# Patient Record
Sex: MALE | Race: WHITE | NOT HISPANIC OR LATINO | Employment: UNEMPLOYED | URBAN - METROPOLITAN AREA
[De-identification: names, ages, dates, MRNs, and addresses within clinical notes are randomized per-mention and may not be internally consistent; named-entity substitution may affect disease eponyms.]

---

## 2018-01-16 ENCOUNTER — OFFICE VISIT (OUTPATIENT)
Dept: FAMILY MEDICINE | Facility: CLINIC | Age: 43
End: 2018-01-16
Payer: COMMERCIAL

## 2018-01-16 VITALS
WEIGHT: 172.5 LBS | SYSTOLIC BLOOD PRESSURE: 115 MMHG | HEIGHT: 72 IN | HEART RATE: 51 BPM | DIASTOLIC BLOOD PRESSURE: 71 MMHG | OXYGEN SATURATION: 98 % | TEMPERATURE: 97.6 F | BODY MASS INDEX: 23.36 KG/M2

## 2018-01-16 DIAGNOSIS — R09.81 NASAL CONGESTION: Primary | ICD-10-CM

## 2018-01-16 DIAGNOSIS — R53.83 OTHER FATIGUE: ICD-10-CM

## 2018-01-16 DIAGNOSIS — Z23 NEED FOR DIPHTHERIA-TETANUS-PERTUSSIS (TDAP) VACCINE: ICD-10-CM

## 2018-01-16 DIAGNOSIS — Z13.9 SCREENING FOR CONDITION: ICD-10-CM

## 2018-01-16 DIAGNOSIS — M72.2 PLANTAR FASCIITIS: ICD-10-CM

## 2018-01-16 DIAGNOSIS — L91.8 SKIN TAG: ICD-10-CM

## 2018-01-16 LAB
ALBUMIN SERPL-MCNC: 4.3 G/DL (ref 3.3–4.6)
ALP SERPL-CCNC: 55 U/L (ref 40–150)
ALT SERPL-CCNC: 21 U/L (ref 0–70)
AST SERPL-CCNC: 25 U/L (ref 0–55)
BASOPHILS # BLD AUTO: 0 10E9/L (ref 0–0.2)
BASOPHILS NFR BLD AUTO: 0.3 %
BILIRUB SERPL-MCNC: 1.7 MG/DL (ref 0.2–1.3)
BUN SERPL-MCNC: 9 MG/DL (ref 5–24)
CALCIUM SERPL-MCNC: 8.7 MG/DL (ref 8.5–10.4)
CHLORIDE SERPLBLD-SCNC: 105 MMOL/L (ref 94–109)
CHOLEST SERPL-MCNC: 164 MG/DL (ref 0–200)
CHOLEST/HDLC SERPL: 3.1 {RATIO} (ref 0–5)
CO2 SERPL-SCNC: 29 MMOL/L (ref 20–32)
CREAT SERPL-MCNC: 1.3 MG/DL (ref 0.8–1.5)
DIFFERENTIAL METHOD BLD: NORMAL
EGFR CALCULATED (BLACK REFERENCE): 77.5
EGFR CALCULATED (NON BLACK REFERENCE): 64
EOSINOPHIL # BLD AUTO: 0.1 10E9/L (ref 0–0.7)
EOSINOPHIL NFR BLD AUTO: 1.8 %
ERYTHROCYTE [DISTWIDTH] IN BLOOD BY AUTOMATED COUNT: 12.6 % (ref 10–15)
FASTING SPECIMEN: YES
GLUCOSE SERPL-MCNC: 101 MG/DL (ref 60–109)
HCT VFR BLD AUTO: 45.4 % (ref 40–53)
HDLC SERPL-MCNC: 54 MG/DL
HGB BLD-MCNC: 15.8 G/DL (ref 13.3–17.7)
IMM GRANULOCYTES # BLD: 0 10E9/L (ref 0–0.4)
IMM GRANULOCYTES NFR BLD: 0.1 %
LDLC SERPL CALC-MCNC: 93 MG/DL (ref 0–129)
LYMPHOCYTES # BLD AUTO: 1.7 10E9/L (ref 0.8–5.3)
LYMPHOCYTES NFR BLD AUTO: 25.8 %
MCH RBC QN AUTO: 32 PG (ref 26.5–33)
MCHC RBC AUTO-ENTMCNC: 34.8 G/DL (ref 31.5–36.5)
MCV RBC AUTO: 92 FL (ref 78–100)
MONOCYTES # BLD AUTO: 0.6 10E9/L (ref 0–1.3)
MONOCYTES NFR BLD AUTO: 8.8 %
NEUTROPHILS # BLD AUTO: 4.2 10E9/L (ref 1.6–8.3)
NEUTROPHILS NFR BLD AUTO: 63.2 %
NRBC # BLD AUTO: 0 10*3/UL
NRBC BLD AUTO-RTO: 0 /100
PLATELET # BLD AUTO: 189 10E9/L (ref 150–450)
POTASSIUM SERPL-SCNC: 4 MMOL/L (ref 3.4–5.3)
PROT SERPL-MCNC: 7.2 G/DL (ref 6.8–8.8)
RBC # BLD AUTO: 4.94 10E12/L (ref 4.4–5.9)
SODIUM SERPL-SCNC: 142 MMOL/L (ref 133–144)
TRIGL SERPL-MCNC: 85 MG/DL (ref 0–150)
TSH SERPL DL<=0.005 MIU/L-ACNC: 2.77 MU/L (ref 0.4–4)
VLDL-CHOLESTEROL: 17 (ref 7–32)
WBC # BLD AUTO: 6.7 10E9/L (ref 4–11)

## 2018-01-16 NOTE — NURSING NOTE
Screening Questionnaire for Adult Immunization    Are you sick today?   No   Do you have allergies to medications, food, a vaccine component or latex?   No   Have you ever had a serious reaction after receiving a vaccination?   No   Do you have a long-term health problem with heart disease, lung disease, asthma, kidney disease, metabolic disease (e.g. diabetes), anemia, or other blood disorder?   No   Do you have cancer, leukemia, HIV/AIDS, or any other immune system problem?   No   In the past 3 months, have you taken medications that affect  your immune system, such as prednisone, other steroids, or anticancer drugs; drugs for the treatment of rheumatoid arthritis, Crohn s disease, or psoriasis; or have you had radiation treatments?   No   Have you had a seizure, or a brain or other nervous system problem?   No   During the past year, have you received a transfusion of blood or blood     products, or been given immune (gamma) globulin or antiviral drug?   No   For women: Are you pregnant or is there a chance you could become        pregnant during the next month?   No   Have you received any vaccinations in the past 4 weeks?   No     Immunization questionnaire answers were all negative.      Given by Brenda Stovall. Patient instructed to remain in clinic for 15 minutes afterwards, and to report any adverse reaction to me immediately.       Screening performed by Brenda Stovall on 1/16/2018 at 9:21 AM.

## 2018-01-16 NOTE — NURSING NOTE
"42 year old  Chief Complaint   Patient presents with     Respiratory Problems     Works with chemicals and having respiratory issues.      Nose Problem     pt states he gets the booger like things in his nose. they are rock hard and appears to have blood in them. going on for 6 months now       Blood pressure 115/71, pulse 51, temperature 97.6  F (36.4  C), temperature source Oral, height 6' 0.24\" (183.5 cm), weight 172 lb 8 oz (78.2 kg), SpO2 98 %. Body mass index is 23.24 kg/(m^2).  There is no problem list on file for this patient.      Wt Readings from Last 2 Encounters:   01/16/18 172 lb 8 oz (78.2 kg)     BP Readings from Last 3 Encounters:   01/16/18 115/71         No current outpatient prescriptions on file.     No current facility-administered medications for this visit.        Social History   Substance Use Topics     Smoking status: Never Smoker     Smokeless tobacco: Never Used     Alcohol use Yes      Comment: maybe three drinks a week       Health Maintenance Due   Topic Date Due     TETANUS IMMUNIZATION (SYSTEM ASSIGNED)  09/07/1992     LIPID SCREEN Q5 YR MALE (SYSTEM ASSIGNED)  09/07/2009       No results found for: PAP      January 16, 2018 8:03 AM  "

## 2018-01-16 NOTE — PATIENT INSTRUCTIONS
Aquaphor ointment  Or   Vaseline petroleum jelly    1% hydrocortisone ointment    Apply several times a day with cotton swab    Nasal saline spray as needed      Plantar Fasciitis  Plantar fasciitis is a painful swelling of the plantar fascia. The plantar fascia is a thick, fibrous layer of tissue. It covers the bones on the bottom of your foot. And it supports the foot bones in an arched position.  This can happen gradually or suddenly. It usually affects one foot at a time. Heel pain can be sharp, like a knife sticking into the bottom of your foot. You may feel pain after exercising, long-distance jogging, stair climbing, long periods of standing, or after standing up.  Risk factors include: non-active lifestyle, arthritis, diabetes, obesity or recent weight gain, flat foot, high arch. Wearing high heels, loose shoes, or shoes with poor arch support for long periods of time adds to the risk. This problem is commonly found in runners and dancers. It also found in people who stand on hard surfaces for long periods of time.  Foot pain from this condition is usually worse in the morning. But it improves with walking. By the end of the day there may be a dull aching. Treatment requires short-term rest and controlling swelling. It may take up to 9 months before all symptoms go away. Rarely, a steroid injection into the foot, or surgery, may be needed.  Home care    If you are overweight, lose weight to help healing.    Choose supportive shoes with good arch support and shock absorbency. Replace athletic shoes when they become worn out. Don t walk or run barefoot.    Premade or custom-fitted shoe inserts may be helpful. Inserts made of silicone seem to be the most effective. Custom-made inserts can be provided by a podiatrist or foot specialist, physical therapist, or orthopedist.    Premade or custom-made night splints keep the heel stretched out while you sleep. They may prevent morning pain.    Avoid activities that  stress the feet: jogging, prolonged standing or walking, contact sports, etc.    First thing in the morning and before sports, stretch the bottom of your feet. Gently flex your ankle so the toes move toward your knee.    Icing may help control heel pain. Apply an ice pack to the heel for 10-20 minutes as a preventive. Or ice your heel after a severe flare-up of symptoms. You may repeat this every 1-2 hours as needed.    You may use over-the-counter pain medicine to control pain, unless another medicine was prescribed. Anti-inflammatory pain medicines, such as ibuprofen or naproxen, may work better than acetaminophen. If you have chronic liver or kidney disease or ever had a stomach ulcer or GI bleeding, talk with your healthcare provider before using these medicines.  Follow-up care  Follow up with your healthcare provider, physical therapist, or podiatrist or foot specialist as advised.  Call for an appointment if pain worsens or there is no relief after a few weeks of home treatment. Shoe inserts, a night splint, or a special boot may be required.  If X-rays were taken, you will be told of any new findings that may affect your care.  When to seek medical advice  Call your healthcare provider right away if any of these occur:    Foot swelling    Redness with increasing pain  Date Last Reviewed: 11/21/2015 2000-2017 [employer]. [address] [place] [address], [place] [address]. All rights reserved. This information is not intended as a substitute for professional medical care. Always follow your healthcare professional's instructions.

## 2018-01-16 NOTE — LETTER
[employer] Customer Service  [facility]  [address], [address]  [place] [address]  Fax: [phone]  Phone: [phone]      January 15, 2018      Tip Colin  [address]  [place] [address]        Dear Tip,    Thank you for your interest in becoming a [employer] user!    Your access code is: [email or web address]  Expires: 4/15/2018  7:53 AM     Please access the [employer] website at [email or web address].  Below the ID and password fields, select the  Sign Up Now  as New User.  You will be prompted to enter the access code listed above as well as additional personal information.  Please follow the directions carefully when creating your username and password.    If you allow your access code to , or if you have any questions please call a [employer] Representative at [phone] during normal clinic hours.     Sincerely,      [employer] Customer Service  [facility]

## 2018-01-16 NOTE — LETTER
[facility]  [address], [address]  [place] [address]  Phone: [phone]  Fax: [phone]       Date: January 17, 2018      Tip Colin  [address]  [place] [address]        Dear Tip,    Your thyroid level is in perfect range.    Your blood count profile looks perfect, there is no anemia or other abnormal findings.    Your cholesterol profile is normal.    Your glucose, kidney and liver tests are normal. Some lab results are noted to be slightly out of target range but this is not concerning. No follow up testing is indicated.    It was a pleasure to meet you . Thank you for choosing [facility] for your care.    Sincerely,    Fatuma Houston MD  Internal Medicine/Pediatrics    Resulted Orders   CBC with platelets differential   Result Value Ref Range    WBC 6.7 4.0 - 11.0 10e9/L    RBC Count 4.94 4.4 - 5.9 10e12/L    Hemoglobin 15.8 13.3 - 17.7 g/dL    Hematocrit 45.4 40.0 - 53.0 %    MCV 92 78 - 100 fl    MCH 32.0 26.5 - 33.0 pg    MCHC 34.8 31.5 - 36.5 g/dL    RDW 12.6 10.0 - 15.0 %    Platelet Count 189 150 - 450 10e9/L    Diff Method Automated Method     % Neutrophils 63.2 %    % Lymphocytes 25.8 %    % Monocytes 8.8 %    % Eosinophils 1.8 %    % Basophils 0.3 %    % Immature Granulocytes 0.1 %    Nucleated RBCs 0 0 /100    Absolute Neutrophil 4.2 1.6 - 8.3 10e9/L    Absolute Lymphocytes 1.7 0.8 - 5.3 10e9/L    Absolute Monocytes 0.6 0.0 - 1.3 10e9/L    Absolute Eosinophils 0.1 0.0 - 0.7 10e9/L    Absolute Basophils 0.0 0.0 - 0.2 10e9/L    Abs Immature Granulocytes 0.0 0 - 0.4 10e9/L    Absolute Nucleated RBC 0.0    Lipid Panel ([place])   Result Value Ref Range    FASTING SPECIMEN yes     Cholesterol 164.0 0.0 - 200.0    HDL Cholesterol 54.0 >40.0    Triglycerides 85.0 0.0 - 150.0    Cholesterol/HDL Ratio 3.1 0.0 - 5.0    LDL Cholesterol Direct 93.0 0.0 - 129.0    VLDL-Cholesterol 17.0 7.0 - 32.0    Comprehensive Metabolic Panel ([place])   Result Value Ref Range    Glucose 101.0 60.0 - 109.0 mg/dL    Urea Nitrogen 9.0 5.0 - 24.0 mg/dL    Calcium 8.7 8.5 - 10.4 mg/dL    Creatinine 1.3 0.8 - 1.5 mg/dL    eGFR Calculated (Non Black Reference) 64.0 >60.0    eGFR Calculated (Black Reference) 77.5 >60.0    Sodium 142.0 133.0 - 144.0 mmol/L    Potassium 4.0 3.4 - 5.3 mmol/L    Chloride 105.0 94.0 - 109.0 mmol/L    Carbon Dioxide 29.0 20.0 - 32.0 mmol/L    Albumin 4.3 3.3 - 4.6 g/dL    Alkaline Phosphatase 55.0 40.0 - 150.0 U/L    ALT 21.0 0.0 - 70.0 U/L    AST 25.0 0.0 - 55.0 U/L    Bilirubin Total 1.7 (H) 0.2 - 1.3 mg/dL    Protein Total 7.2 6.8 - 8.8 g/dL   TSH with free T4 reflex   Result Value Ref Range    TSH 2.77 0.40 - 4.00 mU/L

## 2018-01-27 PROBLEM — M72.2 PLANTAR FASCIITIS: Status: ACTIVE | Noted: 2018-01-27

## 2018-01-27 PROBLEM — R09.81 NASAL CONGESTION: Status: ACTIVE | Noted: 2018-01-27
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
## 2018-08-23 ENCOUNTER — OFFICE VISIT (OUTPATIENT)
Dept: OPHTHALMOLOGY | Facility: CLINIC | Age: 43
End: 2018-08-23
Payer: COMMERCIAL

## 2018-08-23 DIAGNOSIS — H52.13 MYOPIA OF BOTH EYES: Primary | ICD-10-CM

## 2018-08-23 ASSESSMENT — CUP TO DISC RATIO
OS_RATIO: 0.20
OD_RATIO: 0.20

## 2018-08-23 ASSESSMENT — REFRACTION_CURRENTRX
OD_CYLINDER: SPHERE
OD_DIAMETER: 14.0
OS_CYLINDER: SPHERE
OD_DIAMETER: 14.0
OD_SPHERE: -3.50
OS_BRAND: AV OASYS
OD_SPHERE: -3.75
OD_BASECURVE: 8.4
OS_BRAND: AV OASYS
OS_BASECURVE: 8.4
OS_BASECURVE: 8.4
OS_SPHERE: -3.00
OD_BASECURVE: 8.4
OS_DIAMETER: 14.0
OS_SPHERE: -3.25
OD_BRAND: AV OASYS
OS_CYLINDER: SPHERE
OD_CYLINDER: SPHERE
OS_DIAMETER: 14.0
OD_BRAND: AV OASYS

## 2018-08-23 ASSESSMENT — REFRACTION_MANIFEST
OS_CYLINDER: SPHERE
OD_CYLINDER: SPHERE
OS_SPHERE: -3.25
OD_SPHERE: -3.50

## 2018-08-23 ASSESSMENT — CONF VISUAL FIELD
OS_NORMAL: 1
OD_NORMAL: 1

## 2018-08-23 ASSESSMENT — VISUAL ACUITY
METHOD: SNELLEN - LINEAR
OD_PH_SC: .
CORRECTION_TYPE: CONTACTS
OS_CC: 20/25
OS_CC+: -1
OD_CC: 20/20

## 2018-08-23 ASSESSMENT — TONOMETRY
OD_IOP_MMHG: 14
IOP_METHOD: ICARE
OS_IOP_MMHG: 14

## 2018-08-23 ASSESSMENT — SLIT LAMP EXAM - LIDS
COMMENTS: NORMAL
COMMENTS: NORMAL

## 2018-08-23 NOTE — MR AVS SNAPSHOT
After Visit Summary   2018    Tip Colin    MRN: [id number]           Patient Information     Date Of Birth          1975        Visit Information        Provider Department      2018 8:40 AM Edith Solo OD [facility]        Today's Diagnoses     Myopia of both eyes    -  1       Follow-ups after your visit        Who to contact     Please call your clinic at [phone] to:    Ask questions about your health    Make or cancel appointments    Discuss your medicines    Learn about your test results    Speak to your doctor            Additional Information About Your Visit        MyChart Information     tadoÂ° is an electronic gateway that provides easy, online access to your medical records. With tadoÂ°, you can request a clinic appointment, read your test results, renew a prescription or communicate with your care team.     To sign up for tadoÂ° visit the website at [email or web address]   You will be asked to enter the access code listed below, as well as some personal information. Please follow the directions to create your username and password.     Your access code is: [email or web address]  Expires: 2018  6:30 AM     Your access code will  in 90 days. If you need help or a new code, please contact your [facility] or call [phone] for assistance.        Care EveryWhere ID     This is your Care EveryWhere ID. This could be used by other organizations to access your [facility] medical records  [id number]         Blood Pressure from Last 3 Encounters:   18 115/71    Weight from Last 3 Encounters:   18 78.2 kg (172 lb 8 oz)              We Performed the Following     REFRACTION [[id number]]        Primary Care Provider Fax #    Physician No Ref-Primary [phone]       No address on file        Equal Access to Services     HUONG HOUSTON : gayla Zhao qaybta kaalmada  libby carlsonolirafita la'aasamira ahBenji [facility] [phone].    ATENCIÓN: Si habla jose angelañol, tiene a almeida disposición servicios gratuitos de asistencia lingüística. Anne al [phone].    We comply with applicable federal civil rights laws and [place] laws. We do not discriminate on the basis of race, color, national origin, age, disability, sex, sexual orientation, or gender identity.            Thank you!     Thank you for choosing [facility]  for your care. Our goal is always to provide you with excellent care. Hearing back from our patients is one way we can continue to improve our services. Please take a few minutes to complete the written survey that you may receive in the mail after your visit with us. Thank you!             Your Updated Medication List - Protect others around you: Learn how to safely use, store and throw away your medicines at www.disposemymeds.org.      Notice  As of 8/23/2018 10:52 AM    You have not been prescribed any medications.

## 2018-08-23 NOTE — NURSING NOTE
"Chief Complaints and History of Present Illnesses   Patient presents with     COMPREHENSIVE EYE EXAM     HPI    Affected eye(s):  Both   Symptoms:     No blurred vision   No floaters   No flashes   No tearing   Dryness (Comment: \"a little bit\" per pt, wearing the CTL he has in for over a year. )         Do you have eye pain now?:  No      Comments:  Patient notes that CTL are comfortable, unsure if vision has changed, currently wearing the wrong CTL RX in one eye.     Tracy Torres August 23, 2018 8:41 AM               "

## 2018-08-23 NOTE — PROGRESS NOTES
A/P  1.) Myopia each eye  -Spec Rx updated, asymptomatic at this time for presbyopia  -Mild CL overwear - stressed CL hygiene with pt  -Dilated ocular health unremarkable each eye  -Discussed upcoming presbyopia and options    Monitor 1-2 years routine, sooner prn    I have confirmed the patient's CC, HPI and reviewed Past Medical History, Past Surgical History, Social History, Family History, Problem List, Medication List and agree with Tech note.     Edith Solo, OD FAAO

## 2019-06-14 NOTE — PROGRESS NOTES
"Tip Colin is here for a general check up.  He is not fasting. He is up to date on eye exams (glasses) but overdue on dental visits. Wears seat belt.--yes Wears bike helmet--na.  Concerns today:    HCM  Tip is here for preventive exam. Up to date on vaccines.     Diet   He reports his diet is \"terrible\". Eats Chips pretzels soda instead of nutritious lunch. Eats prepackaged meals for dinner. Little in the way of fruits, veggies. Body mass index is 23.8 kg/m .  No formal exercise program.    Testicular discomfort  Tip reports a 3 month history of right-sided testicular discomfort. No injuries. Testicle is sensitive even with light touch. No dysuria, no pelvic pain or fever. He reports that he had a testicular mass in the past, seen on ultrasound,a number of years ago.. He thinks this was a spermatocele. Cannot recall where the testing had been done. He is concerned that there may be a mass. Not taking anything at this time for pain. No family history of any testicular cancers.    Chronic pain of left knee  Tip has had surgery at the left lateral knee. This was evidently done for a bone spur that was rubbing against the tendon. He now reports that he has episodic jabbing pain at the left lateral knee and a sense that his knees going to give out. This usually happens when he goes from a sitting to a standing position. Sometimes happens when he lifts his 3-year-old son. There is no redness warmth or swelling.    Benign paroxysmal positional vertigo, unspecified laterality  He has a history of vertigo. The last significant episode was about 3 years ago when he vomited. He tells me he is prone to have vertigo, no symptoms today. He is wondering if there is anything that he can take. He had exercises that he was doing but thinks he has lost them. Not sure he has time to go to a formal vestibular therapy clinic. We discussed Cawthorne exercises and looking on line for vestibular therapy pearls. If that's not " helping, then referral is certainly indicated. He may use meclizine over-the-counter as well.      Health Maintenance   Topic Date Due     PREVENTIVE CARE VISIT  1975     HIV SCREENING  09/07/1989     PHQ-2  01/01/2019     INFLUENZA VACCINE (Season Ended) 09/01/2019     LIPID  01/16/2023     ZOSTER IMMUNIZATION (1 of 2) 09/07/2024     DTAP/TDAP/TD IMMUNIZATION (2 - Td) 01/16/2028     IPV IMMUNIZATION  Aged Out     MENINGITIS IMMUNIZATION  Aged Out         Patient Active Problem List   Diagnosis     Nasal congestion     Plantar fasciitis       Past Surgical History:   Procedure Laterality Date     DENTAL SURGERY       KNEE SURGERY Left     bone spur removed       Family History   Problem Relation Age of Onset     Uterine Cancer Mother      Retinal detachment Father      Breast Cancer Sister 53     Cerebrovascular Disease Paternal Grandfather      Retinal detachment Brother      Glaucoma No family hx of      Macular Degeneration No family hx of        Social    Works as a   Son 3 yo    HABITS:  Tob: never  ETOH: 3/week  Calcium: 0-1 per day  Caffeine: 20 oz cola  Exercise: walking    MALE ROS  Partner: monogamous with spouse  ED: none  Contraception: condoms  STD concerns: none  BPH: no symptoms      No current outpatient medications on file.     No Known Allergies      ROS  CONSTITUTIONAL:NEGATIVE for fever, chills, change in weight  INTEGUMENTARY/SKIN: NEGATIVE for worrisome rashes, moles or lesions  EYES: NEGATIVE for vision changes or irritation  ENT/MOUTH: NEGATIVE for ear, mouth and throat problems  RESP:NEGATIVE for significant cough or SOB  CV: NEGATIVE for chest pain, palpitations, RIVAS, orthopnea, PND  or peripheral edema  GI: NEGATIVE for nausea, abdominal pain, heartburn, or change in bowel habits  :NEGATIVE for frequency, dysuria, or hematuria,right-sided testicular pain, as above  MUSCULOSKELETAL:knee pain as above  NEURO: NEGATIVE for weakness, dizziness or  paresthesias,episodic vertigo as above  ENDOCRINE: NEGATIVE for polyuria/dipsia,  temperature intolerance, skin/hair changes  HEME/ALLERGY/IMMUNE: NEGATIVE for bleeding problems  PSYCHIATRIC: history of depression, anxiety, has seen counselor in the past, mood is stable, no current meds    PHQ9 score = 8  ANAIS 7 score = 2    EXAM  /72 (BP Location: Right arm, Patient Position: Sitting, Cuff Size: Adult Regular)   Pulse 62   Temp 97.4  F (36.3  C) (Oral)   Resp 16   Ht 1.829 m (6')   Wt 79.6 kg (175 lb 8 oz)   SpO2 97%   BMI 23.80 kg/m    GENERAL APPEARANCE: Alert, pleasant, NAD  EYES: PERRL, EOMI, conjunctiva clear  HENT: TM normal bilaterally. Nose and mouth without lesions  NECK: no adenopathy, thyroid normal to palpation  RESP: lungs clear to auscultation bilaterally,   Axillae: no palpable axillary masses or adenopathy  CV: regular rate and rhythm, normal S1 S2, no murmur, no carotid bruits  ABDOMEN: soft, nontender, without HSM or masses. Bowel sounds normal  : no inguinal hernias or adenopathy, no testicular masses, no significant tenderness but there is some thickening at the epididymis on the right side  Rectal exam: deferred  MS: extremities normal- no gross deformities noted, no tender, hot or swollen joints.well healed incision at lateral joint line of left knee.  I cannot reproduce any pain with palpation over the left knee  SKIN: no suspicious lesions or rashes  NEURO: Normal strength and tone, sensory exam grossly normal, DTR normoreflexive in upper and lower extremities  PSYCH: mentation appears normal. and affect normal/bright.  EXT: no peripheral edema, pedal pulses palpable    Assessment:  (Z00.00) Preventative health care  (primary encounter diagnosis)  Comment: 43-year-old male in stable health  Plan: HIV Screening, Lipid Profile, CBC with Plt         ([facility])        Anticipatory guidance given today regarding diet, exercise and calcium intake, safety. Immunizations are up-to-date.  Recommended healthier diet, trying to incorporate fresh fruits and vegetables slowly, cooking more meals at home or packing lunch rather than eating chips and soda for lunch      (N50.819) Testicular discomfort  Comment: right-sided discomfort, previous history of a benign testicular mass, this is not palpated today  Plan: US Testicular and Scrotum, UROLOGY ADULT         REFERRAL        Refer for a testicular ultrasound and follow-up appointment with the urologist    (M25.562,  G89.29) Chronic pain of left knee  Comment: previous history of left knee tendon surgery. Now left knee is buckling when he goes from a sitting to standing position  Plan: ORTHOPEDICS ADULT REFERRAL        Recommend he see orthopedic surgeon for evaluation and treatment    (H81.10) Benign paroxysmal positional vertigo, unspecified laterality  Comment: no current symptoms today  Plan: recommend, Cawthorne exercises, contact me for referral to vestibular therapy if needed.    Fatuma Houston MD  Internal Medicine/Pediatrics

## 2019-06-22 ENCOUNTER — ANCILLARY PROCEDURE (OUTPATIENT)
Dept: ULTRASOUND IMAGING | Facility: CLINIC | Age: 44
End: 2019-06-22
Attending: INTERNAL MEDICINE
Payer: COMMERCIAL

## 2019-06-22 ENCOUNTER — OFFICE VISIT (OUTPATIENT)
Dept: FAMILY MEDICINE | Facility: CLINIC | Age: 44
End: 2019-06-22
Payer: COMMERCIAL

## 2019-06-22 VITALS
OXYGEN SATURATION: 97 % | TEMPERATURE: 97.4 F | HEART RATE: 62 BPM | WEIGHT: 175.5 LBS | DIASTOLIC BLOOD PRESSURE: 72 MMHG | HEIGHT: 72 IN | SYSTOLIC BLOOD PRESSURE: 129 MMHG | RESPIRATION RATE: 16 BRPM | BODY MASS INDEX: 23.77 KG/M2

## 2019-06-22 DIAGNOSIS — M25.562 CHRONIC PAIN OF LEFT KNEE: ICD-10-CM

## 2019-06-22 DIAGNOSIS — H81.10 BENIGN PAROXYSMAL POSITIONAL VERTIGO, UNSPECIFIED LATERALITY: ICD-10-CM

## 2019-06-22 DIAGNOSIS — N50.819 TESTICULAR DISCOMFORT: ICD-10-CM

## 2019-06-22 DIAGNOSIS — G89.29 CHRONIC PAIN OF LEFT KNEE: ICD-10-CM

## 2019-06-22 DIAGNOSIS — Z00.00 PREVENTATIVE HEALTH CARE: Primary | ICD-10-CM

## 2019-06-22 LAB
CHOLEST SERPL-MCNC: 154 MG/DL
ERYTHROCYTE [DISTWIDTH] IN BLOOD BY AUTOMATED COUNT: 12 %
HCT VFR BLD AUTO: 41 % (ref 40–53)
HDLC SERPL-MCNC: 52 MG/DL
HEMOGLOBIN: 14.7 G/DL (ref 13.3–17.7)
LDLC SERPL CALC-MCNC: 84 MG/DL
MCH RBC QN AUTO: 31.9 PG (ref 26.5–35)
MCHC RBC AUTO-ENTMCNC: 35.9 G/DL (ref 32–36)
MCV RBC AUTO: 88.9 FL (ref 78–100)
NONHDLC SERPL-MCNC: 102 MG/DL
PLATELET # BLD AUTO: 197 K/UL (ref 150–450)
RBC # BLD AUTO: 4.61 M/UL (ref 4.4–5.9)
TRIGL SERPL-MCNC: 93 MG/DL
WBC # BLD AUTO: 9 K/UL (ref 4–11)

## 2019-06-22 ASSESSMENT — MIFFLIN-ST. JEOR: SCORE: 1724.06

## 2019-06-22 NOTE — LETTER
[facility]  [address], [address]  [place] [address]  Phone: [phone]  Fax: [phone]       Date: June 25, 2019      Tip Colin  [address]  [place] [address]        Dear Tip,    Enclosed are you lab results.  Your blood counts and HIV tests are normal.    Your cholesterol panel looks fabulous!    Nothing worrisome on your testicular ultrasound. I'd have you follow up with the urologist in regard to the persistent testicular discomfort.    Sincerely,  Fatuma Houston MD  Internal Medicine/Pediatrics    Resulted Orders   HIV Screening   Result Value Ref Range    HIV Antigen Antibody Combo Nonreactive NR^Nonreactive          Comment:      HIV-1 p24 Ag & HIV-1/HIV-2 Ab Not Detected   Lipid Profile   Result Value Ref Range    Cholesterol 154 <200 mg/dL    Triglycerides 93 <150 mg/dL    HDL Cholesterol 52 >39 mg/dL    LDL Cholesterol Calculated 84 <100 mg/dL      Comment:      Desirable:       <100 mg/dl    Non HDL Cholesterol 102 <130 mg/dL   CBC with Plt ([facility])   Result Value Ref Range    WBC 9.0 4.0 - 11.0 K/uL    RBC 4.61 4.40 - 5.90 M/uL    Hemoglobin 14.7 13.3 - 17.7 g/dL    Hematocrit 41.0 40.0 - 53.0 %    MCV 88.9 78.0 - 100.0 fL    MCH 31.9 26.5 - 35.0 pg    MCHC 35.9 32.0 - 36.0 g/dL    Platelets 197.0 150.0 - 450.0 K/uL    RDW 12.0 %

## 2019-06-22 NOTE — NURSING NOTE
43 year old  Chief Complaint   Patient presents with     Physical       Blood pressure 129/72, pulse 62, temperature 97.4  F (36.3  C), temperature source Oral, resp. rate 16, height 1.829 m (6'), weight 79.6 kg (175 lb 8 oz), SpO2 97 %. Body mass index is 23.8 kg/m .  Patient Active Problem List   Diagnosis     Nasal congestion     Plantar fasciitis       Wt Readings from Last 3 Encounters:   06/22/19 79.6 kg (175 lb 8 oz)   01/16/18 78.2 kg (172 lb 8 oz)     BP Readings from Last 6 Encounters:   06/22/19 129/72   01/16/18 115/71       No current outpatient medications on file.     No current facility-administered medications for this visit.        Social History     Tobacco Use     Smoking status: Never Smoker     Smokeless tobacco: Never Used   Substance Use Topics     Alcohol use: Yes     Comment: maybe three drinks a week     Drug use: No       Health Maintenance Due   Topic Date Due     PREVENTIVE CARE VISIT  1975     HIV SCREENING  09/07/1989     PHQ-2  01/01/2019       No results found for: ROBERT Contreras CMA  June 22, 2019 10:29 AM

## 2019-06-24 ENCOUNTER — TELEPHONE (OUTPATIENT)
Dept: FAMILY MEDICINE | Facility: CLINIC | Age: 44
End: 2019-06-24

## 2019-06-24 LAB — HIV 1+2 AB+HIV1 P24 AG SERPL QL IA: NONREACTIVE

## 2019-06-24 ASSESSMENT — ANXIETY QUESTIONNAIRES
2. NOT BEING ABLE TO STOP OR CONTROL WORRYING: NOT AT ALL
7. FEELING AFRAID AS IF SOMETHING AWFUL MIGHT HAPPEN: NOT AT ALL
5. BEING SO RESTLESS THAT IT IS HARD TO SIT STILL: NOT AT ALL
3. WORRYING TOO MUCH ABOUT DIFFERENT THINGS: NOT AT ALL
GAD7 TOTAL SCORE: 2
IF YOU CHECKED OFF ANY PROBLEMS ON THIS QUESTIONNAIRE, HOW DIFFICULT HAVE THESE PROBLEMS MADE IT FOR YOU TO DO YOUR WORK, TAKE CARE OF THINGS AT HOME, OR GET ALONG WITH OTHER PEOPLE: NOT DIFFICULT AT ALL
6. BECOMING EASILY ANNOYED OR IRRITABLE: SEVERAL DAYS
1. FEELING NERVOUS, ANXIOUS, OR ON EDGE: SEVERAL DAYS

## 2019-06-24 ASSESSMENT — PATIENT HEALTH QUESTIONNAIRE - PHQ9
5. POOR APPETITE OR OVEREATING: NOT AT ALL
SUM OF ALL RESPONSES TO PHQ QUESTIONS 1-9: 8

## 2019-06-24 NOTE — TELEPHONE ENCOUNTER
Spoke to patient and relayed Dr. Houston's message. Patient verbalized understanding and agrees with this plan.   Chari Wakefield RN  06/24/19  10:07 AM

## 2019-06-24 NOTE — TELEPHONE ENCOUNTER
----- Message from Fatuma Houston MD sent at 6/24/2019  7:49 AM CDT -----  I saw this patient on Saturday. He is not on My Chart but I ordered labs and a testicular US.    Will you please call pt to let him know his US looks benign . Still see Urology for testicular pain.    Labs are normal.    I'll be in clinic tomorrow.    Thank you!  Fatuma

## 2019-06-25 ASSESSMENT — ANXIETY QUESTIONNAIRES: GAD7 TOTAL SCORE: 2

## 2019-08-16 ENCOUNTER — PRE VISIT (OUTPATIENT)
Dept: UROLOGY | Facility: CLINIC | Age: 44
End: 2019-08-16

## 2019-08-16 NOTE — TELEPHONE ENCOUNTER
MEDICAL RECORDS REQUEST   [facility]  [address] [place] [address]  PHONE: [phone]  Fax: [phone]        FUTURE VISIT INFORMATION                                                   Tip Colin, : 1975 scheduled for future visit at [facility]    APPOINTMENT INFORMATION:    Date: 19    Provider:  LAURA CROCKETT    Reason for Visit/Diagnosis: TESTICULAR DISCOMFORT    REFERRAL INFORMATION:    Referring provider:  NIKOS HERNANDEZ    Specialty: MD    Referring providers clinic:  [facility] contact number:  [phone];    RECORDS REQUESTED FOR VISIT                                                     NOTES  STATUS/DETAILS   OFFICE NOTE from referring provider  yes   OFFICE NOTE from other specialist  no   DISCHARGE SUMMARY from hospital  no   DISCHARGE REPORT from the ER  no   OPERATIVE REPORT  no   MEDICATION LIST  no       PRE-VISIT CHECKLIST      Record collection complete Yes   Appointment appropriately scheduled           (right time/right provider) Yes   MyChart activation No   Questionnaire complete If no, please explain IN PROCESS     Completed by: Niki Romero

## 2019-08-16 NOTE — TELEPHONE ENCOUNTER
Reason for Visit: Consult    Diagnosis: Testicular discomfort, testicular lumps per pt's wife    Orders/Procedures/Records: Records available    Contact Patient: N/A    Rooming Requirements: Rosemary Rand  08/16/19  9:03 AM

## 2019-08-20 NOTE — PROGRESS NOTES
Chief Complaint:   Right-sided testicular pain         History of Present Illness:    Tip Colin is a very pleasant 43 year old male who presents for evaluation of the above. Per chart review, he has been experiencing this pain for the past 3 months. He reported to his PCP that he has had a testicular mass in the past, evaluated with an ultrasound, and he believed that this was a spermatocele. A scrotal US was repeated on 6/22/19, which demonstrated a probable small right testicular appendix and a 4 mm left epididymal head cyst. Today, however, he states that this is no longer giving him pain.            Past Medical History:   History reviewed. No pertinent past medical history.         Past Surgical History:     Past Surgical History:   Procedure Laterality Date     DENTAL SURGERY       KNEE SURGERY Left     bone spur removed            Medications     No current outpatient medications on file.     No current facility-administered medications for this visit.             Family History:     Family History   Problem Relation Age of Onset     Uterine Cancer Mother      Breast Cancer Sister 53     Cerebrovascular Disease Paternal Grandfather      Retinal detachment Brother      Glaucoma No family hx of      Macular Degeneration No family hx of             Social History:     Social History     Socioeconomic History     Marital status:      Spouse name: Not on file     Number of children: Not on file     Years of education: Not on file     Highest education level: Not on file   Occupational History     Not on file   Social Needs     Financial resource strain: Not on file     Food insecurity:     Worry: Not on file     Inability: Not on file     Transportation needs:     Medical: Not on file     Non-medical: Not on file   Tobacco Use     Smoking status: Never Smoker     Smokeless tobacco: Never Used   Substance and Sexual Activity     Alcohol use: Yes     Comment: maybe three drinks a week     Drug use:  No     Sexual activity: Yes     Partners: Female     Birth control/protection: Condom   Lifestyle     Physical activity:     Days per week: Not on file     Minutes per session: Not on file     Stress: Not on file   Relationships     Social connections:     Talks on phone: Not on file     Gets together: Not on file     Attends Holiness service: Not on file     Active member of club or organization: Not on file     Attends meetings of clubs or organizations: Not on file     Relationship status: Not on file     Intimate partner violence:     Fear of current or ex partner: Not on file     Emotionally abused: Not on file     Physically abused: Not on file     Forced sexual activity: Not on file   Other Topics Concern     Parent/sibling w/ CABG, MI or angioplasty before 65F 55M? Not Asked   Social History Narrative     Not on file            Allergies:   Patient has no known allergies.         Review of Systems:  From intake questionnaire   Negative 14 system review except as noted on HPI, nurse's note.         Physical Exam:   Patient is a 43 year old  male   Vitals: Blood pressure 114/79, pulse 62, height 1.829 m (6'), weight 79.4 kg (175 lb).  General Appearance Adult: Alert, no acute distress, oriented  Lungs: no respiratory distress, or pursed lip breathing  Heart: No obvious jugular venous distension present  Abdomen: soft, nontender, no organomegaly or masses, Body mass index is 23.73 kg/m .  Musculoskeltal: extremities normal, no peripheral edema  Skin: no suspicious lesions or rashes  Neuro: Alert, oriented, speech and mentation normal  : deferred      Labs and Pathology:    I personally reviewed all applicable laboratory data and went over findings with patient  Significant for:    CBC RESULTS:  Recent Labs   Lab Test 06/22/19  1141 01/16/18  0924   WBC  --  6.7   HGB 14.7 15.8   PLT  --  189        BMP RESULTS:  Recent Labs   Lab Test 01/16/18  0916   .0   POTASSIUM 4.0   CHLORIDE 105.0   CO2 29.0    .0   BUN 9.0   CR 1.3   CHRISTEL 8.7         Imaging:    I personally reviewed all applicable imaging and went over findings with patient.  Significant for:    Results for orders placed or performed in visit on 06/22/19   US Testicular and Scrotum    Narrative    EXAMINATION: US TESTICULAR AND SCROTAL, 6/22/2019 1:38 PM     COMPARISON: None.    HISTORY: None available    TECHNIQUE: The scrotum was scanned in standard fashion with  specialized ultrasound transducer(s) using both grey scale and limited  color Doppler techniques.    Findings:  The testes demonstrate normal and symmetric echotexture and  vascularity. No evidence of a focal lesion except for a small possible  testicular appendix near the epididymal head.  The right testicle  measures 5.3 x 3.3 x 3.0 cm and the left measures 5.1 x 3.3 x 2.7 cm .  There is no evidence of torsion.    There is no evidence of a significant hydrocele or varicocele.    The left epididymis shows a 4 mm cyst, but is otherwise unremarkable.      Impression    Impression:   1. 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  2. 4 mm left epididymal head cyst.  3. Otherwise, normal sonographic appearance of the scrotum.    KARYN AUGUSTE            Assessment and Plan:     Assessment: 43 year old male with previous scrotal pain, found to have 4 mm left epididymal head cyst on US. Fortunately, this is no longer causing him pain. We discussed that this is benign, and would only warrant further intervention if it causes significant pain.     Plan:  -Follow up as needed. For worsening pain, follow up with Dr. Clarke to discuss potential interventions.       Diane Ibrahim, CNP  Department of Urology

## 2019-08-23 ENCOUNTER — OFFICE VISIT (OUTPATIENT)
Dept: UROLOGY | Facility: CLINIC | Age: 44
End: 2019-08-23
Attending: INTERNAL MEDICINE
Payer: COMMERCIAL

## 2019-08-23 ENCOUNTER — PRE VISIT (OUTPATIENT)
Dept: UROLOGY | Facility: CLINIC | Age: 44
End: 2019-08-23

## 2019-08-23 VITALS
HEART RATE: 62 BPM | BODY MASS INDEX: 23.7 KG/M2 | WEIGHT: 175 LBS | DIASTOLIC BLOOD PRESSURE: 79 MMHG | SYSTOLIC BLOOD PRESSURE: 114 MMHG | HEIGHT: 72 IN

## 2019-08-23 DIAGNOSIS — L72.9 SCROTAL CYST: Primary | ICD-10-CM

## 2019-08-23 ASSESSMENT — MIFFLIN-ST. JEOR: SCORE: 1721.79

## 2019-08-23 ASSESSMENT — PAIN SCALES - GENERAL: PAINLEVEL: NO PAIN (0)

## 2019-08-23 NOTE — LETTER
8/23/2019       RE: Tip Colin  [address] [place] [address]     Dear Colleague,    Thank you for referring your patient, Tip Colin, to the [facility] UROLOGY AND INST FOR PROSTATE AND UROLOGIC CANCERS at [facility]. Please see a copy of my visit note below.            Chief Complaint:   Right-sided testicular pain         History of Present Illness:    Tip Colin is a very pleasant 43 year old male who presents for evaluation of the above. Per chart review, he has been experiencing this pain for the past 3 months. He reported to his PCP that he has had a testicular mass in the past, evaluated with an ultrasound, and he believed that this was a spermatocele. A scrotal US was repeated on 6/22/19, which demonstrated a probable small right testicular appendix and a 4 mm left epididymal head cyst. Today, however, he states that this is no longer giving him pain.            Past Medical History:   History reviewed. No pertinent past medical history.         Past Surgical History:     Past Surgical History:   Procedure Laterality Date     DENTAL SURGERY       KNEE SURGERY Left     bone spur removed            Medications     No current outpatient medications on file.     No current facility-administered medications for this visit.             Family History:     Family History   Problem Relation Age of Onset     Uterine Cancer Mother      Breast Cancer Sister 53     Cerebrovascular Disease Paternal Grandfather      Retinal detachment Brother      Glaucoma No family hx of      Macular Degeneration No family hx of             Social History:     Social History     Socioeconomic History     Marital status:      Spouse name: Not on file     Number of children: Not on file     Years of education: Not on file     Highest education level: Not on file   Occupational History     Not on file   Social Needs     Financial resource strain: Not on file     Food insecurity:      Worry: Not on file     Inability: Not on file     Transportation needs:     Medical: Not on file     Non-medical: Not on file   Tobacco Use     Smoking status: Never Smoker     Smokeless tobacco: Never Used   Substance and Sexual Activity     Alcohol use: Yes     Comment: maybe three drinks a week     Drug use: No     Sexual activity: Yes     Partners: Female     Birth control/protection: Condom   Lifestyle     Physical activity:     Days per week: Not on file     Minutes per session: Not on file     Stress: Not on file   Relationships     Social connections:     Talks on phone: Not on file     Gets together: Not on file     Attends Advent service: Not on file     Active member of club or organization: Not on file     Attends meetings of clubs or organizations: Not on file     Relationship status: Not on file     Intimate partner violence:     Fear of current or ex partner: Not on file     Emotionally abused: Not on file     Physically abused: Not on file     Forced sexual activity: Not on file   Other Topics Concern     Parent/sibling w/ CABG, MI or angioplasty before 65F 55M? Not Asked   Social History Narrative     Not on file            Allergies:   Patient has no known allergies.         Review of Systems:  From intake questionnaire   Negative 14 system review except as noted on HPI, nurse's note.         Physical Exam:   Patient is a 43 year old  male   Vitals: Blood pressure 114/79, pulse 62, height 1.829 m (6'), weight 79.4 kg (175 lb).  General Appearance Adult: Alert, no acute distress, oriented  Lungs: no respiratory distress, or pursed lip breathing  Heart: No obvious jugular venous distension present  Abdomen: soft, nontender, no organomegaly or masses, Body mass index is 23.73 kg/m .  Musculoskeltal: extremities normal, no peripheral edema  Skin: no suspicious lesions or rashes  Neuro: Alert, oriented, speech and mentation normal  : deferred      Labs and Pathology:    I personally reviewed  all applicable laboratory data and went over findings with patient  Significant for:    CBC RESULTS:  Recent Labs   Lab Test 06/22/19  1141 01/16/18  0924   WBC  --  6.7   HGB 14.7 15.8   PLT  --  189        BMP RESULTS:  Recent Labs   Lab Test 01/16/18  0916   .0   POTASSIUM 4.0   CHLORIDE 105.0   CO2 29.0   .0   BUN 9.0   CR 1.3   CHRISTEL 8.7         Imaging:    I personally reviewed all applicable imaging and went over findings with patient.  Significant for:    Results for orders placed or performed in visit on 06/22/19   US Testicular and Scrotum    Narrative    EXAMINATION: US TESTICULAR AND SCROTAL, 6/22/2019 1:38 PM     COMPARISON: None.    HISTORY: None available    TECHNIQUE: The scrotum was scanned in standard fashion with  specialized ultrasound transducer(s) using both grey scale and limited  color Doppler techniques.    Findings:  The testes demonstrate normal and symmetric echotexture and  vascularity. No evidence of a focal lesion except for a small possible  testicular appendix near the epididymal head.  The right testicle  measures 5.3 x 3.3 x 3.0 cm and the left measures 5.1 x 3.3 x 2.7 cm .  There is no evidence of torsion.    There is no evidence of a significant hydrocele or varicocele.    The left epididymis shows a 4 mm cyst, but is otherwise unremarkable.      Impression    Impression:   1. 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  2. 4 mm left epididymal head cyst.  3. Otherwise, normal sonographic appearance of the scrotum.    KARYN AUGUSTE            Assessment and Plan:     Assessment: 43 year old male with previous scrotal pain, found to have 4 mm left epididymal head cyst on US. Fortunately, this is no longer causing him pain. We discussed that this is benign, and would only warrant further intervention if it causes significant pain.     Plan:  -Follow up as needed. For worsening pain, follow up with Dr. Clarke to discuss potential interventions.       Diane Ibrahim,  CNP  Department of Urology     Again, thank you for allowing me to participate in the care of your patient.      Sincerely,    Diane Ibrahim CNP

## 2019-08-23 NOTE — PATIENT INSTRUCTIONS
UROLOGY CLINIC VISIT PATIENT INSTRUCTIONS    1) Follow up as needed      If you have any issues, questions or concerns in the meantime, do not hesitate to contact us at [phone] or via [facility].     It was a pleasure meeting with you today.  Thank you for allowing me and my team the privilege of caring for you today.  YOU are the reason we are here, and I truly hope we provided you with the excellent service you deserve.  Please let us know if there is anything else we can do for you so that we can be sure you are leaving completely satisfied with your care experience.    Diane Ibrahim, CNP

## 2019-08-23 NOTE — NURSING NOTE
Chief Complaint   Patient presents with     Consult     New patient consult for testicular pain     Maria Victoria Muller, [facility]

## 2019-10-28 DIAGNOSIS — M25.562 LEFT KNEE PAIN, UNSPECIFIED CHRONICITY: Primary | ICD-10-CM

## 2019-10-28 NOTE — TELEPHONE ENCOUNTER
DIAGNOSIS: Consult for L Knee Pain, per Pt's wife, referred specifically to knee surgeon. Referred by Fatuma Houston MD. Bilateral knee pain, bilateral leg aching.    APPOINTMENT DATE: Oct 30, 2019    NOTES STATUS DETAILS   OFFICE NOTE from referring provider Internal 6/22/19 Dr. Houston   OFFICE NOTE from other specialist N/A    DISCHARGE SUMMARY from hospital N/A    DISCHARGE REPORT from the ER N/A    OPERATIVE REPORT N/A due to age of record Bone spur surgery ~30 years ago   MEDICATION LIST Internal    IMPLANT RECORD/STICKER N/A    LABS     CBC/DIFF N/A    CULTURES N/A    INJECTIONS DONE IN RADIOLOGY N/A    MRI N/A    CT SCAN N/A    XRAYS (IMAGES & REPORTS) N/A    TUMOR     PATHOLOGY  Slides & report N/A      10/28/19 SE  2:09 PM  Called patient to ask about past surgery, imaging. He states no recent records.

## 2019-10-30 ENCOUNTER — OFFICE VISIT (OUTPATIENT)
Dept: ORTHOPEDICS | Facility: CLINIC | Age: 44
End: 2019-10-30
Payer: COMMERCIAL

## 2019-10-30 ENCOUNTER — ANCILLARY PROCEDURE (OUTPATIENT)
Dept: GENERAL RADIOLOGY | Facility: CLINIC | Age: 44
End: 2019-10-30
Attending: ORTHOPAEDIC SURGERY
Payer: COMMERCIAL

## 2019-10-30 ENCOUNTER — PRE VISIT (OUTPATIENT)
Dept: ORTHOPEDICS | Facility: CLINIC | Age: 44
End: 2019-10-30

## 2019-10-30 VITALS — BODY MASS INDEX: 24.38 KG/M2 | WEIGHT: 180 LBS | HEIGHT: 72 IN

## 2019-10-30 DIAGNOSIS — G89.29 BILATERAL CHRONIC KNEE PAIN: Primary | ICD-10-CM

## 2019-10-30 DIAGNOSIS — M25.562 BILATERAL CHRONIC KNEE PAIN: Primary | ICD-10-CM

## 2019-10-30 DIAGNOSIS — M25.561 BILATERAL CHRONIC KNEE PAIN: Primary | ICD-10-CM

## 2019-10-30 DIAGNOSIS — M25.562 LEFT KNEE PAIN, UNSPECIFIED CHRONICITY: ICD-10-CM

## 2019-10-30 ASSESSMENT — MIFFLIN-ST. JEOR: SCORE: 1739.47

## 2019-10-30 NOTE — LETTER
10/30/2019    RE: Tip Colin  [address] [place] [address]     CHIEF CONCERN: Bilateral knee pain    HISTORY:   Very pleasant 44-year-old male comes in with 5-year history of knee pain.  No specific event.  Denies instability events.  Worse when he walks quite a bit or at the end of the day.  Seems to be a little bit better with rest.  Feels like his overall stable course the last 5 years.  Does not yet modify his activities or prevent him from doing things that he wants or needs to do.  He has tried some intermittent anti-inflammatories in the past.  Never on a scheduled basis.    PAST MEDICAL HISTORY: (Reviewed with the patient and in the [facility] medical record)  1. None    PAST SURGICAL HISTORY: (Reviewed with the patient and in the [facility] medical record)  1. None    MEDICATIONS: (Reviewed with the patient and in the [facility] medical record)    Notable medications include: No blood thinners or narcotics    ALLERGIES: (Reviewed with the patient and in the [facility] medical record)  1. None      SOCIAL HISTORY: (Reviewed with the patient and in the medical record)  --Tobacco: Non-smoker  --Occupation: Works doing [employer]  --Avocation/Sport: He likes walking his dog playing with his 7-year-old son and has some interest in doing some skateboarding    FAMILY HISTORY: (Reviewed with the patient and in the medical record)  -- No family history of bleeding, clotting, or difficulty with anesthesia    REVIEW OF SYSTEMS: (Reviewed with the patient and on the health intake form)  -- A comprehensive 10 point review of systems was conducted and is negative except as noted in the HPI    EXAM:     General: Awake, Alert and Oriented, No acute Distress. Articulate and Interactive    Body mass index is 24.41 kg/m .    Right lower extremity :    Skin is Warm and Well perfused, no suggestion of infection    Stable to varus and valgus stress testing.  Stable anterior and posterior drawer testing.    Lachman 0, no pivot  shift    Range of motion 0 to 135 degrees    1 quadrant medial lateral translation patella.  Tilt to neutral.    EHL/FHL/TA/GS 5/5    Sensation intact L3-S1    2+ Dorsalis Pedis Pulse    Left lower extremity :    Skin is Warm and Well perfused, no suggestion of infection    Stable to varus and valgus stress testing.  Stable anterior and posterior drawer testing.    Lachman 0, no pivot shift    Range of motion 0 to 135 degrees    1 quadrant medial lateral translation patella.  Tilt to neutral.    EHL/FHL/TA/GS 5/5    Sensation intact L3-S1    2+ Dorsalis Pedis Pulse    IMAGING:    Plain Radiographs: No fractures dislocations well-preserved joint space    MRI: None    ASSESSMENT:  1. Bilateral knee pain, 5 years duration.  No inciting event.    PLAN:  1. At this time we will maximize nonsurgical management.  We will start him on a course of physical therapy for strengthening, range of motion, functional return to desired activities.  2. I will start him on a scheduled oral anti-inflammatory 600 mg p.o. 3 times daily for 1 week.  Then transition to an as-needed basis  3. If these things failed to provide him lasting benefit he will return to my clinic we will consider cortical steroid injection at that time Versus pursuing an MRI of his knee      Reginald Rodriguez MD

## 2019-10-30 NOTE — NURSING NOTE
Reason For Visit:   Chief Complaint   Patient presents with     Consult     Bilateral knee         ?  No  Occupation: [employer]  Currently working? Yes.  Work status?  Full time.    Date of injury: NA, bothersome for the last 4-5 years  Type of injury: Chronic knee pain, worse after he stops moving at the end of the day.    Date of surgery: 1993  Type of surgery: Left knee (bone removal?).  Smoker: No  Request smoking cessation information: No    Pain Assessment  Patient Currently in Pain: No    Ht 1.829 m (6')   Wt 81.6 kg (180 lb)   BMI 24.41 kg/m         No Known Allergies    No current outpatient medications on file.     No current facility-administered medications for this visit.          Radha Walls, ATC

## 2019-10-30 NOTE — PROGRESS NOTES
CHIEF CONCERN: Bilateral knee pain    HISTORY:   Very pleasant 44-year-old male comes in with 5-year history of knee pain.  No specific event.  Denies instability events.  Worse when he walks quite a bit or at the end of the day.  Seems to be a little bit better with rest.  Feels like his overall stable course the last 5 years.  Does not yet modify his activities or prevent him from doing things that he wants or needs to do.  He has tried some intermittent anti-inflammatories in the past.  Never on a scheduled basis.    PAST MEDICAL HISTORY: (Reviewed with the patient and in the [facility] medical record)  1. None    PAST SURGICAL HISTORY: (Reviewed with the patient and in the [facility] medical record)  1. None    MEDICATIONS: (Reviewed with the patient and in the [facility] medical record)    Notable medications include: No blood thinners or narcotics    ALLERGIES: (Reviewed with the patient and in the [facility] medical record)  1. None      SOCIAL HISTORY: (Reviewed with the patient and in the medical record)  --Tobacco: Non-smoker  --Occupation: Works doing [employer]  --Avocation/Sport: He likes walking his dog playing with his 7-year-old son and has some interest in doing some skateboarding    FAMILY HISTORY: (Reviewed with the patient and in the medical record)  -- No family history of bleeding, clotting, or difficulty with anesthesia    REVIEW OF SYSTEMS: (Reviewed with the patient and on the health intake form)  -- A comprehensive 10 point review of systems was conducted and is negative except as noted in the HPI    EXAM:     General: Awake, Alert and Oriented, No acute Distress. Articulate and Interactive    Body mass index is 24.41 kg/m .    Right lower extremity :    Skin is Warm and Well perfused, no suggestion of infection    Stable to varus and valgus stress testing.  Stable anterior and posterior drawer testing.    Lachman 0, no pivot shift    Range of motion 0 to 135 degrees    1 quadrant medial lateral translation  patella.  Tilt to neutral.    EHL/FHL/TA/GS 5/5    Sensation intact L3-S1    2+ Dorsalis Pedis Pulse    Left lower extremity :    Skin is Warm and Well perfused, no suggestion of infection    Stable to varus and valgus stress testing.  Stable anterior and posterior drawer testing.    Lachman 0, no pivot shift    Range of motion 0 to 135 degrees    1 quadrant medial lateral translation patella.  Tilt to neutral.    EHL/FHL/TA/GS 5/5    Sensation intact L3-S1    2+ Dorsalis Pedis Pulse    IMAGING:    Plain Radiographs: No fractures dislocations well-preserved joint space    MRI: None    ASSESSMENT:  1. Bilateral knee pain, 5 years duration.  No inciting event.    PLAN:  1. At this time we will maximize nonsurgical management.  We will start him on a course of physical therapy for strengthening, range of motion, functional return to desired activities.  2. I will start him on a scheduled oral anti-inflammatory 600 mg p.o. 3 times daily for 1 week.  Then transition to an as-needed basis  3. If these things failed to provide him lasting benefit he will return to my clinic we will consider cortical steroid injection at that time Versus pursuing an MRI of his knee

## 2020-02-06 ENCOUNTER — OFFICE VISIT (OUTPATIENT)
Dept: FAMILY MEDICINE | Facility: CLINIC | Age: 45
End: 2020-02-06
Payer: COMMERCIAL

## 2020-02-06 VITALS
HEART RATE: 63 BPM | SYSTOLIC BLOOD PRESSURE: 116 MMHG | DIASTOLIC BLOOD PRESSURE: 72 MMHG | WEIGHT: 178.75 LBS | OXYGEN SATURATION: 97 % | BODY MASS INDEX: 24.21 KG/M2 | TEMPERATURE: 97.9 F | HEIGHT: 72 IN | RESPIRATION RATE: 16 BRPM

## 2020-02-06 DIAGNOSIS — M79.674 PAIN OF TOE OF RIGHT FOOT: Primary | ICD-10-CM

## 2020-02-06 ASSESSMENT — MIFFLIN-ST. JEOR: SCORE: 1733.8

## 2020-02-06 NOTE — PROGRESS NOTES
Tip Colin is a 44 year old male here for the following issues:    Right foot pain  Tip is a 44 year old male is here for intermittent right foot pain that began 3 days ago. Pain is located at the distal, medial aspect of the right foot. Reports some warmth and redness. No known injury. He has been taking ibuprofen for pain with mild relief. No family history of gout. He drinks about 48 ounces of soda daily. He reports his diet is poor.     Patient Active Problem List   Diagnosis     Nasal congestion     Plantar fasciitis       No current outpatient medications on file.       No Known Allergies     EXAM  /72 (BP Location: Left arm, Patient Position: Sitting, Cuff Size: Adult Regular)   Pulse 63   Temp 97.9  F (36.6  C) (Oral)   Resp 16   Ht 1.829 m (6')   Wt 81.1 kg (178 lb 12 oz)   SpO2 97%   BMI 24.24 kg/m    Gen: Alert, pleasant, NAD  Right Foot: Pain with palpation over the 1st metatarsal head of the right foot, area is warm, red.     Assessment:  (M79.674) Pain of toe of right foot (primary encounter diagnosis)  Comment: Suspect gout.  Plan: Uric acid        Check uric acid level today. Recommend cool packs, ibuprofen. If not improving, consider burst of steroids. Gave handout on gout.     Notify MD of worsening symptoms.     Fatuma Houston MD  Internal Medicine/Pediatrics    I, Jeremiah Roberson, am serving as a scribe to document services personally performed by Dr. Fatuma Houston, based on data collection and the provider's statements to me. Dr. Houston has reviewed, edited and approved the above note.

## 2020-02-06 NOTE — NURSING NOTE
44 year old  Chief Complaint   Patient presents with     Foot Problems     right foot pain, especially near big toe, intermittently painful, worse the past 3 day        Blood pressure 116/72, pulse 63, temperature 97.9  F (36.6  C), temperature source Oral, resp. rate 16, height 1.829 m (6'), weight 81.1 kg (178 lb 12 oz), SpO2 97 %. Body mass index is 24.24 kg/m .  Patient Active Problem List   Diagnosis     Nasal congestion     Plantar fasciitis       Wt Readings from Last 2 Encounters:   02/06/20 81.1 kg (178 lb 12 oz)   10/30/19 81.6 kg (180 lb)     BP Readings from Last 3 Encounters:   02/06/20 116/72   08/23/19 114/79   06/22/19 129/72         No current outpatient medications on file.     No current facility-administered medications for this visit.        Social History     Tobacco Use     Smoking status: Never Smoker     Smokeless tobacco: Never Used   Substance Use Topics     Alcohol use: Yes     Comment: maybe three drinks a week     Drug use: No       Health Maintenance Due   Topic Date Due     INFLUENZA VACCINE (1) 09/01/2019     PHQ-2  01/01/2020       No results found for: PAP      February 6, 2020 4:47 PM

## 2020-02-07 LAB — URATE SERPL-MCNC: 6.2 MG/DL (ref 3.5–7.2)

## 2020-03-11 ENCOUNTER — HEALTH MAINTENANCE LETTER (OUTPATIENT)
Age: 45
End: 2020-03-11

## 2020-12-27 ENCOUNTER — HEALTH MAINTENANCE LETTER (OUTPATIENT)
Age: 45
End: 2020-12-27

## 2021-06-18 ENCOUNTER — TELEPHONE (OUTPATIENT)
Dept: FAMILY MEDICINE | Facility: CLINIC | Age: 46
End: 2021-06-18

## 2021-06-18 NOTE — TELEPHONE ENCOUNTER
Patient is requesting Dr. Houston review recent ED visit form [facility]. Records were pulled in. Patient did give me verbal consent to sign ELLA form and have records accessed.     Would like an ok by Rosemarie to continue on to PT. ([email or web address] msg would be suffice)     Norma

## 2021-06-28 NOTE — MR AVS SNAPSHOT
After Visit Summary   1/16/2018    Tip Colin    MRN: [id number]           Patient Information     Date Of Birth          1975        Visit Information        Provider Department      1/16/2018 8:00 AM Fatuma Houston MD [facility]        Today's Diagnoses     Screening for condition    -  1    Other fatigue        Plantar fasciitis        Need for diphtheria-tetanus-pertussis (Tdap) vaccine        Nasal congestion        Skin tag          Care Instructions    Aquaphor ointment  Or   Vaseline petroleum jelly    1% hydrocortisone ointment    Apply several times a day with cotton swab    Nasal saline spray as needed      Plantar Fasciitis  Plantar fasciitis is a painful swelling of the plantar fascia. The plantar fascia is a thick, fibrous layer of tissue. It covers the bones on the bottom of your foot. And it supports the foot bones in an arched position.  This can happen gradually or suddenly. It usually affects one foot at a time. Heel pain can be sharp, like a knife sticking into the bottom of your foot. You may feel pain after exercising, long-distance jogging, stair climbing, long periods of standing, or after standing up.  Risk factors include: non-active lifestyle, arthritis, diabetes, obesity or recent weight gain, flat foot, high arch. Wearing high heels, loose shoes, or shoes with poor arch support for long periods of time adds to the risk. This problem is commonly found in runners and dancers. It also found in people who stand on hard surfaces for long periods of time.  Foot pain from this condition is usually worse in the morning. But it improves with walking. By the end of the day there may be a dull aching. Treatment requires short-term rest and controlling swelling. It may take up to 9 months before all symptoms go away. Rarely, a steroid injection into the foot, or surgery, may be needed.  Home care    If you are overweight, lose weight to help healing.    Choose  [facility])  Urology  [address] [facility], [address] [place] [address]  Phone: [phone]  Fax: [phone]  Follow Up Time: 1 week    your primary care doctor,   Phone: (   )    -  Fax: (   )    -  Follow Up Time: 1 week   supportive shoes with good arch support and shock absorbency. Replace athletic shoes when they become worn out. Don t walk or run barefoot.    Premade or custom-fitted shoe inserts may be helpful. Inserts made of silicone seem to be the most effective. Custom-made inserts can be provided by a podiatrist or foot specialist, physical therapist, or orthopedist.    Premade or custom-made night splints keep the heel stretched out while you sleep. They may prevent morning pain.    Avoid activities that stress the feet: jogging, prolonged standing or walking, contact sports, etc.    First thing in the morning and before sports, stretch the bottom of your feet. Gently flex your ankle so the toes move toward your knee.    Icing may help control heel pain. Apply an ice pack to the heel for 10-20 minutes as a preventive. Or ice your heel after a severe flare-up of symptoms. You may repeat this every 1-2 hours as needed.    You may use over-the-counter pain medicine to control pain, unless another medicine was prescribed. Anti-inflammatory pain medicines, such as ibuprofen or naproxen, may work better than acetaminophen. If you have chronic liver or kidney disease or ever had a stomach ulcer or GI bleeding, talk with your healthcare provider before using these medicines.  Follow-up care  Follow up with your healthcare provider, physical therapist, or podiatrist or foot specialist as advised.  Call for an appointment if pain worsens or there is no relief after a few weeks of home treatment. Shoe inserts, a night splint, or a special boot may be required.  If X-rays were taken, you will be told of any new findings that may affect your care.  When to seek medical advice  Call your healthcare provider right away if any of these occur:    Foot swelling    Redness with increasing pain  Date Last Reviewed: 11/21/2015 2000-2017 [employer]. [address] [place] [address], [place] [address]. All rights reserved. This  "information is not intended as a substitute for professional medical care. Always follow your healthcare professional's instructions.                Follow-ups after your visit        Who to contact     Please call your clinic at [phone] to:    Ask questions about your health    Make or cancel appointments    Discuss your medicines    Learn about your test results    Speak to your doctor   If you have compliments or concerns about an experience at your clinic, or if you wish to file a complaint, please contact [facility] Patient Relations at [phone] or email us at [email or web address]         Additional Information About Your Visit        [employer] Information     [employer]Ve is an electronic gateway that provides easy, online access to your medical records. With KienVe, you can request a clinic appointment, read your test results, renew a prescription or communicate with your care team.     To sign up for [employer] visit the website at [email or web address]   You will be asked to enter the access code listed below, as well as some personal information. Please follow the directions to create your username and password.     Your access code is: [email or web address]  Expires: 4/15/2018  7:53 AM     Your access code will  in 90 days. If you need help or a new code, please contact your [facility] or call [phone] for assistance.        Care EveryWhere ID     This is your Care EveryWhere ID. This could be used by other organizations to access your [facility] medical records  [id number]        Your Vitals Were     Pulse Temperature Height Pulse Oximetry BMI (Body Mass Index)       51 97.6  F (36.4  C) (Oral) 6' 0.24\" (183.5 cm) 98% 23.24 kg/m2        Blood Pressure from Last 3 Encounters:   18 115/71    Weight from Last 3 Encounters:   18 172 lb 8 oz (78.2 kg)              We Performed the Following     CBC with platelets " differential     Comprehensive Metabolic Panel ([place])     Lipid Panel ([place])     TDAP VACCINE ([facility])     TSH with free T4 reflex        Primary Care Provider Fax #    Physician No Ref-Primary [phone]       No address on file        Equal Access to Services     HUONG HOUSTON : Hadii aad ku hadfletchermindi Teresa, wanasreenda luqadaha, qaybta kaalmada robyn, libby radhain hayaasamira hernandez jackirafita franco. So [facility] [phone].    ATENCIÓN: Si habla español, tiene a almeida disposición servicios gratuitos de asistencia lingüística. Llame al [phone].    We comply with applicable federal civil rights laws and [place] laws. We do not discriminate on the basis of race, color, national origin, age, disability, sex, sexual orientation, or gender identity.            Thank you!     Thank you for choosing [facility]  for your care. Our goal is always to provide you with excellent care. Hearing back from our patients is one way we can continue to improve our services. Please take a few minutes to complete the written survey that you may receive in the mail after your visit with us. Thank you!             Your Updated Medication List - Protect others around you: Learn how to safely use, store and throw away your medicines at www.disposemymeds.org.      Notice  As of 1/16/2018  9:16 AM    You have not been prescribed any medications.

## 2021-10-09 ENCOUNTER — HEALTH MAINTENANCE LETTER (OUTPATIENT)
Age: 46
End: 2021-10-09

## 2021-12-07 ENCOUNTER — OFFICE VISIT (OUTPATIENT)
Dept: FAMILY MEDICINE | Facility: CLINIC | Age: 46
End: 2021-12-07
Payer: COMMERCIAL

## 2021-12-07 VITALS
OXYGEN SATURATION: 98 % | DIASTOLIC BLOOD PRESSURE: 81 MMHG | TEMPERATURE: 97 F | WEIGHT: 187 LBS | HEART RATE: 57 BPM | RESPIRATION RATE: 15 BRPM | BODY MASS INDEX: 25.36 KG/M2 | SYSTOLIC BLOOD PRESSURE: 123 MMHG

## 2021-12-07 DIAGNOSIS — R59.1 LYMPHADENOPATHY: Primary | ICD-10-CM

## 2021-12-07 NOTE — PROGRESS NOTES
"SUBJECTIVE:   Tip Colin is a 46 year old male who presents to clinic today to discuss the following problem(s).    \"swollen lymph node or gland\"  - started Saturday, bilateral submandibular lymph nodes  - also saliva glands under his tongue were very tender, so much so that it was painful to eat anything  - also significant tonsil stones  - symptoms started to improve yesterday  - all symptoms have mostly resolved at this point  - currently denies fever, chills, body aches, sore throat, anosmia, fatigue     ROS: 10 point ROS neg other than the symptoms noted above in the HPI.    Today's PHQ-2:  PHQ-2 ( 1999 Pfizer) 12/7/2021 2/6/2020   Q1: Little interest or pleasure in doing things 0 0   Q2: Feeling down, depressed or hopeless 1 0   PHQ-2 Score 1 0   PHQ-2 Total Score (12-17 Years)- Positive if 3 or more points; Administer PHQ-A if positive - 0       No past medical history on file.  Past Surgical History:   Procedure Laterality Date     DENTAL SURGERY       KNEE SURGERY Left     bone spur removed     Family History   Problem Relation Age of Onset     Uterine Cancer Mother      Breast Cancer Sister 53     Cerebrovascular Disease Paternal Grandfather      Retinal detachment Brother      Glaucoma No family hx of      Macular Degeneration No family hx of      Social History     Tobacco Use     Smoking status: Never Smoker     Smokeless tobacco: Never Used   Substance Use Topics     Alcohol use: Yes     Comment: maybe three drinks a week     Drug use: No     Social History     Social History Narrative     Not on file       No current outpatient medications on file.     No current facility-administered medications for this visit.     I have reviewed the patient's past medical, surgical, family, and social history.     OBJECTIVE:   /81 (BP Location: Right arm, Patient Position: Sitting, Cuff Size: Adult Regular)   Pulse 57   Temp 97  F (36.1  C) (Skin)   Resp 15   Wt 84.8 kg (187 lb)   SpO2 98%   BMI 25.36 " kg/m      Constitutional: well-appearing, appears stated age  Eyes: conjunctivae without erythema, sclera anicteric.  ENT: tender lymphadenopathy at submandibular nodes bilaterally, posterior oropharynx appears non-erythematous without obvious exudate   Cardiac: regular rate and rhythm, normal S1/S2, no murmur/rubs/gallops  Respiratory: lungs clear to auscultation bilaterally, normal work of breathing, no wheezes/crackles  Skin: no rashes, lesions, or wounds  Psych: affect is full and appropriate, speech is fluent and non-pressured    ASSESSMENT AND PLAN:     Tip was seen today for mass.    Diagnoses and all orders for this visit:    Lymphadenopathy    Chief suspicion for reactive lymphadenopathy to local irritant/infection. Symptoms appear to be resolving at this point. Advised patient to contact this clinic if symptoms should worsen acutely.       Oskar Gerard MD  [facility]  12/07/2021, 7:57 AM

## 2021-12-07 NOTE — NURSING NOTE
46 year old  Chief Complaint   Patient presents with     Mass     swollwn glands in neck and under tongue since friday, improving        Blood pressure 123/81, pulse 57, temperature 97  F (36.1  C), temperature source Skin, resp. rate 15, weight 84.8 kg (187 lb), SpO2 98 %. Body mass index is 25.36 kg/m .  Patient Active Problem List   Diagnosis     Nasal congestion     Plantar fasciitis       Wt Readings from Last 2 Encounters:   12/07/21 84.8 kg (187 lb)   02/06/20 81.1 kg (178 lb 12 oz)     BP Readings from Last 3 Encounters:   12/07/21 123/81   02/06/20 116/72   08/23/19 114/79         No current outpatient medications on file.     No current facility-administered medications for this visit.       Social History     Tobacco Use     Smoking status: Never Smoker     Smokeless tobacco: Never Used   Substance Use Topics     Alcohol use: Yes     Comment: maybe three drinks a week     Drug use: No       Health Maintenance Due   Topic Date Due     ADVANCE CARE PLANNING  Never done     HEPATITIS C SCREENING  Never done     PREVENTIVE CARE VISIT  06/22/2020     PHQ-2  01/01/2021     INFLUENZA VACCINE (1) 09/01/2021     COVID-19 Vaccine (2 - Pfizer 2-dose series) 09/17/2021       No results found for: PAP      December 7, 2021 10:57 AM

## 2022-01-29 ENCOUNTER — HEALTH MAINTENANCE LETTER (OUTPATIENT)
Age: 47
End: 2022-01-29

## 2022-04-20 ENCOUNTER — OFFICE VISIT (OUTPATIENT)
Dept: FAMILY MEDICINE | Facility: CLINIC | Age: 47
End: 2022-04-20
Payer: COMMERCIAL

## 2022-04-20 VITALS
TEMPERATURE: 98.1 F | RESPIRATION RATE: 12 BRPM | BODY MASS INDEX: 24.75 KG/M2 | SYSTOLIC BLOOD PRESSURE: 110 MMHG | WEIGHT: 182.75 LBS | DIASTOLIC BLOOD PRESSURE: 73 MMHG | OXYGEN SATURATION: 96 % | HEART RATE: 73 BPM | HEIGHT: 72 IN

## 2022-04-20 DIAGNOSIS — D22.9 NUMEROUS SKIN MOLES: ICD-10-CM

## 2022-04-20 DIAGNOSIS — Z86.39 HISTORY OF ELEVATED GLUCOSE: ICD-10-CM

## 2022-04-20 DIAGNOSIS — Z12.11 SCREEN FOR COLON CANCER: ICD-10-CM

## 2022-04-20 DIAGNOSIS — Z00.00 ANNUAL PHYSICAL EXAM: Primary | ICD-10-CM

## 2022-04-20 DIAGNOSIS — R79.9 ABNORMAL BLOOD CELL COUNT: ICD-10-CM

## 2022-04-20 DIAGNOSIS — M10.9 ACUTE GOUTY ARTHRITIS: ICD-10-CM

## 2022-04-20 DIAGNOSIS — Z13.6 SCREENING FOR CARDIOVASCULAR CONDITION: ICD-10-CM

## 2022-04-20 NOTE — PATIENT INSTRUCTIONS
"ASSESSMENT/PLAN:    Annual Exam/Preventive Issues   -Discussed Colorectal cancer screening - He chose \"Cologuard testing.   -Check Lipids and BMP    -Specific concerns:     1. Abnormal CBC when donating blood.   - Recheck with a smear.  - Referred to Hematology    2. Multiple skin moles  - Referred to Dermatology    3. History of gout with high uric acid levels.   - Check uric acid    Finally, encouraged regular exercise and helmet when cycling.    *For labs, call the [facility] to schedule  -Follow up: one year or as needed.     Paul Smith MD, MS  "

## 2022-04-20 NOTE — NURSING NOTE
"46 year old  Chief Complaint   Patient presents with     Physical       Blood pressure 110/73, pulse 73, temperature 98.1  F (36.7  C), temperature source Skin, resp. rate 12, height 1.83 m (6' 0.05\"), weight 82.9 kg (182 lb 12 oz), SpO2 96 %. Body mass index is 24.75 kg/m .  Patient Active Problem List   Diagnosis     Nasal congestion     Plantar fasciitis       Wt Readings from Last 2 Encounters:   04/20/22 82.9 kg (182 lb 12 oz)   12/07/21 84.8 kg (187 lb)     BP Readings from Last 3 Encounters:   04/20/22 110/73   12/07/21 123/81   02/06/20 116/72         No current outpatient medications on file.     No current facility-administered medications for this visit.       Social History     Tobacco Use     Smoking status: Never Smoker     Smokeless tobacco: Never Used   Vaping Use     Vaping Use: Never used   Substance Use Topics     Alcohol use: Yes     Comment: maybe three drinks a week     Drug use: No       Health Maintenance Due   Topic Date Due     ADVANCE CARE PLANNING  Never done     COLORECTAL CANCER SCREENING  Never done     HEPATITIS C SCREENING  Never done     MEDICARE ANNUAL WELLNESS VISIT  06/22/2020     INFLUENZA VACCINE (1) 09/01/2021       No results found for: PAP      April 20, 2022 2:59 PM    "

## 2022-04-20 NOTE — PROGRESS NOTES
"Tip Colin presents to [facility] today to have an annual exam. He's doing well, but was informed of a possible abnormal blood count when donating plasma.     ASSESSMENT/PLAN:    Annual Exam/Preventive Issues   -Discussed Colorectal cancer screening - He chose \"Cologuard testing.   -Check Lipids and BMP    -Specific concerns:     1. Abnormal CBC when donating blood.   - Recheck with a smear.  - Referred to Hematology    2. Multiple skin moles  - Referred to Dermatology    3. History of gout with high uric acid levels.   - Check uric acid    Finally, encouraged regular exercise and helmet when cycling.    *For labs, call the [facility] to schedule  -Follow up: one year or as needed.     Paul Smith MD, MS    Introduction: This my first time meeting Tip.  He is here today for an annual exam.  His major concern is that he was told that a cell line of his was noted to be off during recent plasma donation.  He cannot recall exactly the details but it was something about it gamma cell line.    Checking his labs which were drawn during an emergency room visit [facility], there is a white blood cell count that was slightly elevated at 13.8.  MCHC slightly elevated at 36.  His potassium was slightly low at 3.3  And his AST was very slightly high at 42.  He has been feeling well.  No cough or fever or shortness of breath.    Current Medications include:   No current outpatient medications on file.     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 2016.     Enjoys cycling and playing with son.          Lifestyle habits and Preventive health issues:     Physical activity - Not much now due to parenting. Occasional exercise at home.   Diet generally healthy    Alcohol intake involves about 0-1 nights/week and 1 " "drinks/night.    He does not use tobacco products.   His sleep is \"ok\".    Wears seat belt.--Yes.  Wears bike helmet--Currently does not wear helmet. Discussed. .      MALE ROS  No concerns of STDs.   Erections decreasing slightly    Dental history- No major problems. Had a visit in 2021.   Colorectal cancer screening - None yet. No family history.       ROS  PHQ-2 Score:     PHQ-2 ( 1999 Pfizer) 4/20/2022 12/7/2021   Q1: Little interest or pleasure in doing things 0 0   Q2: Feeling down, depressed or hopeless 0 1   PHQ-2 Score 0 1   PHQ-2 Total Score (12-17 Years)- Positive if 3 or more points; Administer PHQ-A if positive - -       CONSTITUTIONAL:NEGATIVE for fever, chills, change in weight  INTEGUMENTARY/SKIN: NEGATIVE for worrisome rashes, moles or lesions  EYES: NEGATIVE for vision changes or irritation  ENT/MOUTH: NEGATIVE for ear, mouth and throat problems  RESP:NEGATIVE for significant cough or SOB  CV: NEGATIVE for chest pain, palpitations, RIVAS, orthopnea, PND  or peripheral edema  GI: NEGATIVE for nausea, abdominal pain, heartburn, or change in bowel habits  :NEGATIVE for frequency, dysuria, or hematuria  MUSCULOSKELETAL:NEGATIVE for significant arthralgias or myalgia  NEURO: NEGATIVE for weakness, dizziness or paresthesias  ENDOCRINE: NEGATIVE for polyuria/dipsia,  temperature intolerance, skin/hair changes  HEME/ALLERGY/IMMUNE: NEGATIVE for bleeding problems  PSYCHIATRIC: NEGATIVE for changes in mood or affect        Health Maintenance   Topic Date Due     ADVANCE CARE PLANNING  Never done     COLORECTAL CANCER SCREENING  Never done     HEPATITIS C SCREENING  Never done     MEDICARE ANNUAL WELLNESS VISIT  06/22/2020     INFLUENZA VACCINE (1) 09/01/2021     COVID-19 Vaccine (3 - Booster for Pfizer series) 08/23/2022     LIPID  06/22/2024     DTAP/TDAP/TD IMMUNIZATION (2 - Td or Tdap) 01/16/2028     HIV SCREENING  Completed     PHQ-2 (once per calendar year)  Completed     Pneumococcal Vaccine: " "Pediatrics (0 to 5 Years) and At-Risk Patients (6 to 64 Years)  Aged Out     IPV IMMUNIZATION  Aged Out     MENINGITIS IMMUNIZATION  Aged Out     HEPATITIS B IMMUNIZATION  Aged Out         Patient Active Problem List   Diagnosis     Nasal congestion     Plantar fasciitis     Acute gouty arthritis       Past Surgical History:   Procedure Laterality Date     DENTAL SURGERY       KNEE SURGERY Left     bone spur removed       Family History   Problem Relation Age of Onset     Uterine Cancer Mother      Breast Cancer Sister 53     Cerebrovascular Disease Paternal Grandfather      Retinal detachment Brother      Glaucoma No family hx of      Macular Degeneration No family hx of          Immunizations are as follows:      Immunization History   Administered Date(s) Administered     COVID-19,PF,Pfizer (12+ Yrs) 08/27/2021     COVID-19,PF,Pfizer 12+ Yrs (2022 and After) 03/23/2022     Influenza Vaccine IM > 6 months Valent IIV4 (Alfuria,Fluzone) 10/05/2017     TDAP Vaccine (Boostrix) 01/16/2018         EXAM  /73 (BP Location: Right arm, Patient Position: Sitting, Cuff Size: Adult Regular)   Pulse 73   Temp 98.1  F (36.7  C) (Skin)   Resp 12   Ht 1.83 m (6' 0.05\")   Wt 82.9 kg (182 lb 12 oz)   SpO2 96%   BMI 24.75 kg/m      GENERAL APPEARANCE: Appears well and in no distress  HEENT: Eyes are clear.  Neck is supple. No adenopathy, thyroid normal to palpation  RESP: Lungs clear to auscultation bilaterally.  Axillae: no palpable axillary masses or adenopathy  CV: regular rate and rhythm, normal S1 S2, no murmur, no carotid bruits  ABDOMEN: soft, nontender, without HSM or masses. Bowel sounds normal  : Normal male genitalia.  Testes are down.  No masses.  No hernias.   Rectal exam: deferred  MS: Extremities normal- no gross deformities noted, no tender, hot or swollen joints.    SKIN: Multiple skin moles especially on his back.  None look particularly abnormal but he has never had these evaluated.  NEURO: Normal " strength and tone, sensory exam grossly normal  PSYCH: mentation appears normal. and affect normal/bright.  EXT: no peripheral edema    SEE TOP OF NOTE FOR ASSESSMENT AND PLAN      Paul Smith MD, MS  [facility] Department of Family Medicine and [facility]

## 2022-04-21 ENCOUNTER — TELEPHONE (OUTPATIENT)
Dept: FAMILY MEDICINE | Facility: CLINIC | Age: 47
End: 2022-04-21
Payer: COMMERCIAL

## 2022-04-21 ENCOUNTER — LAB (OUTPATIENT)
Dept: LAB | Facility: CLINIC | Age: 47
End: 2022-04-21
Payer: COMMERCIAL

## 2022-04-21 DIAGNOSIS — Z86.39 HISTORY OF ELEVATED GLUCOSE: ICD-10-CM

## 2022-04-21 DIAGNOSIS — Z00.00 ANNUAL PHYSICAL EXAM: ICD-10-CM

## 2022-04-21 DIAGNOSIS — R79.9 ABNORMAL BLOOD CELL COUNT: ICD-10-CM

## 2022-04-21 DIAGNOSIS — M10.9 ACUTE GOUTY ARTHRITIS: ICD-10-CM

## 2022-04-21 DIAGNOSIS — Z13.6 SCREENING FOR CARDIOVASCULAR CONDITION: ICD-10-CM

## 2022-04-21 LAB
ANION GAP SERPL CALCULATED.3IONS-SCNC: 5 MMOL/L (ref 3–14)
BASOPHILS # BLD AUTO: 0 10E3/UL (ref 0–0.2)
BASOPHILS NFR BLD AUTO: 0 %
BUN SERPL-MCNC: 14 MG/DL (ref 7–30)
CALCIUM SERPL-MCNC: 8.8 MG/DL (ref 8.5–10.1)
CHLORIDE BLD-SCNC: 111 MMOL/L (ref 94–109)
CHOLEST SERPL-MCNC: 144 MG/DL
CO2 SERPL-SCNC: 28 MMOL/L (ref 20–32)
CREAT SERPL-MCNC: 0.87 MG/DL (ref 0.66–1.25)
EOSINOPHIL # BLD AUTO: 0.1 10E3/UL (ref 0–0.7)
EOSINOPHIL NFR BLD AUTO: 1 %
ERYTHROCYTE [DISTWIDTH] IN BLOOD BY AUTOMATED COUNT: 11.9 % (ref 10–15)
FASTING STATUS PATIENT QL REPORTED: NORMAL
GFR SERPL CREATININE-BSD FRML MDRD: >90 ML/MIN/1.73M2
GLUCOSE BLD-MCNC: 90 MG/DL (ref 70–99)
HBA1C MFR BLD: 4.8 % (ref 0–5.6)
HCT VFR BLD AUTO: 42.5 % (ref 40–53)
HDLC SERPL-MCNC: 40 MG/DL
HGB BLD-MCNC: 15 G/DL (ref 13.3–17.7)
IMM GRANULOCYTES # BLD: 0 10E3/UL
IMM GRANULOCYTES NFR BLD: 0 %
LDLC SERPL CALC-MCNC: 89 MG/DL
LYMPHOCYTES # BLD AUTO: 1 10E3/UL (ref 0.8–5.3)
LYMPHOCYTES NFR BLD AUTO: 13 %
MCH RBC QN AUTO: 31.5 PG (ref 26.5–33)
MCHC RBC AUTO-ENTMCNC: 35.3 G/DL (ref 31.5–36.5)
MCV RBC AUTO: 89 FL (ref 78–100)
MONOCYTES # BLD AUTO: 0.8 10E3/UL (ref 0–1.3)
MONOCYTES NFR BLD AUTO: 11 %
NEUTROPHILS # BLD AUTO: 5.6 10E3/UL (ref 1.6–8.3)
NEUTROPHILS NFR BLD AUTO: 75 %
NONHDLC SERPL-MCNC: 104 MG/DL
NRBC # BLD AUTO: 0 10E3/UL
NRBC BLD AUTO-RTO: 0 /100
PLATELET # BLD AUTO: 176 10E3/UL (ref 150–450)
POTASSIUM BLD-SCNC: 4.1 MMOL/L (ref 3.4–5.3)
RBC # BLD AUTO: 4.76 10E6/UL (ref 4.4–5.9)
SODIUM SERPL-SCNC: 144 MMOL/L (ref 133–144)
TRIGL SERPL-MCNC: 77 MG/DL
URATE SERPL-MCNC: 6.6 MG/DL (ref 3.5–7.2)
WBC # BLD AUTO: 7.4 10E3/UL (ref 4–11)

## 2022-04-21 PROCEDURE — 36415 COLL VENOUS BLD VENIPUNCTURE: CPT | Performed by: PATHOLOGY

## 2022-04-21 PROCEDURE — 83036 HEMOGLOBIN GLYCOSYLATED A1C: CPT | Performed by: PATHOLOGY

## 2022-04-21 PROCEDURE — 84550 ASSAY OF BLOOD/URIC ACID: CPT | Performed by: PATHOLOGY

## 2022-04-21 PROCEDURE — 80048 BASIC METABOLIC PNL TOTAL CA: CPT | Performed by: PATHOLOGY

## 2022-04-21 PROCEDURE — 80061 LIPID PANEL: CPT | Performed by: PATHOLOGY

## 2022-04-21 PROCEDURE — 85025 COMPLETE CBC W/AUTO DIFF WBC: CPT | Performed by: PATHOLOGY

## 2022-05-02 ENCOUNTER — TELEPHONE (OUTPATIENT)
Dept: FAMILY MEDICINE | Facility: CLINIC | Age: 47
End: 2022-05-02
Payer: COMMERCIAL

## 2022-05-02 DIAGNOSIS — R77.8 ABNORMAL SPEP: Primary | ICD-10-CM

## 2022-05-02 NOTE — TELEPHONE ENCOUNTER
I received paper work from Tip's [employer]asmap[employer] program that he needs a Serum Protein Electrophoresis drawn. I placed an order for that and phoned Tip. He will call for a Lab only visit.   --Paul Smith MD

## 2022-05-03 ENCOUNTER — LAB (OUTPATIENT)
Dept: LAB | Facility: CLINIC | Age: 47
End: 2022-05-03
Payer: COMMERCIAL

## 2022-05-03 DIAGNOSIS — R77.8 ABNORMAL SPEP: ICD-10-CM

## 2022-05-03 LAB — TOTAL PROTEIN SERUM FOR ELP: 6.8 G/DL (ref 6.8–8.8)

## 2022-05-03 PROCEDURE — 36415 COLL VENOUS BLD VENIPUNCTURE: CPT | Performed by: PATHOLOGY

## 2022-05-03 PROCEDURE — 84165 PROTEIN E-PHORESIS SERUM: CPT

## 2022-05-03 PROCEDURE — 99000 SPECIMEN HANDLING OFFICE-LAB: CPT | Performed by: PATHOLOGY

## 2022-05-03 PROCEDURE — 84155 ASSAY OF PROTEIN SERUM: CPT | Mod: 90 | Performed by: PATHOLOGY

## 2022-05-04 ENCOUNTER — MYC MEDICAL ADVICE (OUTPATIENT)
Dept: FAMILY MEDICINE | Facility: CLINIC | Age: 47
End: 2022-05-04
Payer: COMMERCIAL

## 2022-05-04 LAB
ALBUMIN SERPL ELPH-MCNC: 4.3 G/DL (ref 3.7–5.1)
ALPHA1 GLOB SERPL ELPH-MCNC: 0.3 G/DL (ref 0.2–0.4)
ALPHA2 GLOB SERPL ELPH-MCNC: 0.5 G/DL (ref 0.5–0.9)
B-GLOBULIN SERPL ELPH-MCNC: 0.7 G/DL (ref 0.6–1)
GAMMA GLOB SERPL ELPH-MCNC: 1.1 G/DL (ref 0.7–1.6)
M PROTEIN SERPL ELPH-MCNC: 0.3 G/DL
PROT PATTERN SERPL ELPH-IMP: ABNORMAL

## 2022-05-06 ENCOUNTER — PATIENT OUTREACH (OUTPATIENT)
Dept: ONCOLOGY | Facility: CLINIC | Age: 47
End: 2022-05-06
Payer: COMMERCIAL

## 2022-05-06 NOTE — TELEPHONE ENCOUNTER
Spoke with pt regarding need for hematology follow-up based on protein electrophoresis results. Dr. Houston cannot sign off on requested clearance documentation for [employer] until pt is evaluated by hematology for abnormal monoclonal peak. Pt verbalizes understanding and will be sent phone number to call for scheduling a hematology appt.    Armando KATZ, RN  05/06/22 11:28 AM

## 2022-05-06 NOTE — PROGRESS NOTES
A user error has taken place: encounter opened in error, closed for administrative reasons.    Armando KATZ, RN  05/06/22 11:21 AM

## 2022-05-06 NOTE — PROGRESS NOTES
Hematology referral reviewed and NPS will call pt in the next 1-2 business days to schedule ([phone])     Referred By  Referred To   Fatuma Houston MD   [address] [place] [id number]   Phone: [phone]   Fax: [phone]    Diagnoses: Abnormal SPEP   Order: Adult Oncology/Hematology Referral   elevated protein and M spike on protein electrophoresis, pt donates blood products and asking if he can still donate, needs eval for MGUS, [facility] Adult   [address] [facility] [address]   Phone: [phone]   Fax: [phone]

## 2022-05-10 NOTE — PROGRESS NOTES
RECORDS STATUS - ALL OTHER DIAGNOSIS      RECORDS RECEIVED FROM: [facility]   DATE RECEIVED: 5/24/2022   NOTES STATUS DETAILS   OFFICE NOTE from referring provider Complete Epic   referred by Fatuma Houston MD   DISCHARGE SUMMARY from hospital     DISCHARGE REPORT from the ER     CLINICAL TRIAL TREATMENTS TO DATE     LABS     PATHOLOGY REPORTS     ANYTHING RELATED TO DIAGNOSIS Complete Labs last updated on 5/6/2022   GENONOMIC TESTING     TYPE:     IMAGING (NEED IMAGES & REPORT)     CT SCANS     MRI     MAMMO     ULTRASOUND     PET

## 2022-05-11 DIAGNOSIS — R77.8 ABNORMAL SPEP: Primary | ICD-10-CM

## 2022-05-12 NOTE — PROGRESS NOTES
RECORDS STATUS - ALL OTHER DIAGNOSIS      RECORDS RECEIVED FROM: [facility]   DATE RECEIVED: 5/18/2022   NOTES STATUS DETAILS   OFFICE NOTE from referring provider Complete Epic   referred by Fatuma Houston MD   DISCHARGE SUMMARY from hospital     DISCHARGE REPORT from the ER     CLINICAL TRIAL TREATMENTS TO DATE     LABS     PATHOLOGY REPORTS     ANYTHING RELATED TO DIAGNOSIS Complete Labs last updated on 5/6/2022   GENONOMIC TESTING     TYPE:     IMAGING (NEED IMAGES & REPORT)     CT SCANS     MRI     MAMMO     ULTRASOUND     PET

## 2022-05-13 LAB — NONINV COLON CA DNA+OCC BLD SCRN STL QL: NEGATIVE

## 2022-05-16 ENCOUNTER — TELEPHONE (OUTPATIENT)
Dept: FAMILY MEDICINE | Facility: CLINIC | Age: 47
End: 2022-05-16
Payer: COMMERCIAL

## 2022-05-16 NOTE — TELEPHONE ENCOUNTER
Form or Letter Request    Type or form/letter needing completion: [employer] Clearance form  Provider: Sarah  Has provider seen patient for office visit related to reason for form request? Yes  Date form needed: Unknown  Location of form: Sarah's in box  Once completed: Fax form to: [phone] or [phone]     [place]      I phoned Tip and we discussed that he has a visit with Hematology on Wed., 5/18 and they can evaluate his serum electrophoresis and decide on clearance for the [employer] form.   Tip in agreement with plan.  --Paul Smith MD

## 2022-05-18 ENCOUNTER — LAB (OUTPATIENT)
Dept: INFUSION THERAPY | Facility: HOSPITAL | Age: 47
End: 2022-05-18
Attending: INTERNAL MEDICINE
Payer: COMMERCIAL

## 2022-05-18 ENCOUNTER — ONCOLOGY VISIT (OUTPATIENT)
Dept: ONCOLOGY | Facility: HOSPITAL | Age: 47
End: 2022-05-18
Attending: INTERNAL MEDICINE
Payer: COMMERCIAL

## 2022-05-18 ENCOUNTER — HOSPITAL ENCOUNTER (OUTPATIENT)
Dept: RADIOLOGY | Facility: HOSPITAL | Age: 47
Discharge: HOME | End: 2022-05-18
Attending: INTERNAL MEDICINE
Payer: COMMERCIAL

## 2022-05-18 ENCOUNTER — PRE VISIT (OUTPATIENT)
Dept: ONCOLOGY | Facility: HOSPITAL | Age: 47
End: 2022-05-18

## 2022-05-18 VITALS
RESPIRATION RATE: 18 BRPM | OXYGEN SATURATION: 95 % | WEIGHT: 184.5 LBS | SYSTOLIC BLOOD PRESSURE: 123 MMHG | HEART RATE: 60 BPM | TEMPERATURE: 98.4 F | DIASTOLIC BLOOD PRESSURE: 73 MMHG | HEIGHT: 72 IN | BODY MASS INDEX: 24.99 KG/M2

## 2022-05-18 DIAGNOSIS — R77.8 ABNORMAL SPEP: ICD-10-CM

## 2022-05-18 DIAGNOSIS — R79.9 ABNORMAL BLOOD CELL COUNT: ICD-10-CM

## 2022-05-18 PROCEDURE — 86334 IMMUNOFIX E-PHORESIS SERUM: CPT

## 2022-05-18 PROCEDURE — 83521 IG LIGHT CHAINS FREE EACH: CPT

## 2022-05-18 PROCEDURE — 86334 IMMUNOFIX E-PHORESIS SERUM: CPT | Mod: 26 | Performed by: PATHOLOGY

## 2022-05-18 PROCEDURE — 36415 COLL VENOUS BLD VENIPUNCTURE: CPT

## 2022-05-18 PROCEDURE — 77075 RADEX OSSEOUS SURVEY COMPL: CPT

## 2022-05-18 PROCEDURE — G0463 HOSPITAL OUTPT CLINIC VISIT: HCPCS

## 2022-05-18 PROCEDURE — 86335 IMMUNFIX E-PHORSIS/URINE/CSF: CPT

## 2022-05-18 PROCEDURE — 86335 IMMUNFIX E-PHORSIS/URINE/CSF: CPT | Mod: 26 | Performed by: PATHOLOGY

## 2022-05-18 PROCEDURE — 99204 OFFICE O/P NEW MOD 45 MIN: CPT | Performed by: INTERNAL MEDICINE

## 2022-05-18 ASSESSMENT — PAIN SCALES - GENERAL: PAINLEVEL: NO PAIN (0)

## 2022-05-18 NOTE — PROGRESS NOTES
Oncology Rooming Note    May 18, 2022 3:37 PM   Tip Colin is a 46 year old male who presents for:    Chief Complaint   Patient presents with     Hematology     Initial Vitals: /73   Pulse 60   Temp 98.4  F (36.9  C)   Resp 18   Ht 1.829 m (6')   Wt 83.7 kg (184 lb 8 oz)   SpO2 95%   BMI 25.02 kg/m   Estimated body mass index is 25.02 kg/m  as calculated from the following:    Height as of this encounter: 1.829 m (6').    Weight as of this encounter: 83.7 kg (184 lb 8 oz). Body surface area is 2.06 meters squared.  No Pain (0) Comment: Data Unavailable   No LMP for male patient.  Allergies reviewed: Yes  Medications reviewed: Yes    Medications: Medication refills not needed today.  Pharmacy name entered into [employer]:    [facility]ACY #[id number] CLOSED - [place], [place] - [address] AT [facility] [address] [facility] [employer] - [place], [place] - [address]    Clinical concerns: None       Rianna Spears LPN

## 2022-05-18 NOTE — LETTER
5/18/2022         RE: Tip Colin  [address] [place] [address]        Dear Colleague,    Thank you for referring your patient, Tip Colin, to the [facility] [place]. Please see a copy of my visit note below.    Oncology Rooming Note    May 18, 2022 3:37 PM   Tip Colin is a 46 year old male who presents for:    Chief Complaint   Patient presents with     Hematology     Initial Vitals: /73   Pulse 60   Temp 98.4  F (36.9  C)   Resp 18   Ht 1.829 m (6')   Wt 83.7 kg (184 lb 8 oz)   SpO2 95%   BMI 25.02 kg/m   Estimated body mass index is 25.02 kg/m  as calculated from the following:    Height as of this encounter: 1.829 m (6').    Weight as of this encounter: 83.7 kg (184 lb 8 oz). Body surface area is 2.06 meters squared.  No Pain (0) Comment: Data Unavailable   No LMP for male patient.  Allergies reviewed: Yes  Medications reviewed: Yes    Medications: Medication refills not needed today.  Pharmacy name entered into [employer]:    [facility]/PHARMACY #[id number] CLOSED - [place], [place] - [address] AT [facility] [address] [facility] [employer] - [place], [place] - [address]    Clinical concerns: None       Rianna Spears LPN              [facility] Hematology and Oncology Consult Note    Patient: Tip Colin  MRN: [id number]  Date of Service: 05/18/2022      Reason for Visit    Chief Complaint   Patient presents with     Hematology         Assessment/Plan    Problem List Items Addressed This Visit    None     Visit Diagnoses     Abnormal SPEP        Relevant Orders    Protein Immunofixation Serum    Kappa and lambda light chain    Protein electrophoresis timed urine    Protein immunofixation urine    XR Bone Survey Complete    Abnormal blood cell count              Monoclonal gammopathy  SPEP showing monoclonal protein at a concentration of 0.3 g/dL.  Unfortunately the physician was not done and light chains were not ordered.  Suspected  incidentally.  He is not symptomatic.  Pelvis no clinical evidence of myeloma.  All other labs are within normal limits.  Concern for underlying myeloma is extremely low.  We will complete the work-up with serum immunofixation, serum free light chain analysis, UPEP, skeletal survey.  Depending upon what the lab studies and schedule survey shows we will go forward.    He is agreeable with the plan.  I will see him back in 2 weeks as a virtual visit to discuss lab results.    ECOG Performance    0 - Independent    Problem List    Patient Active Problem List   Diagnosis     Nasal congestion     Plantar fasciitis     Acute gouty arthritis     ______________________________________________________________________________    Staging History    Cancer Staging  No matching staging information was found for the patient.      History of presenting illness:  Tip Colin is a 46-year-old male with no major past medical history other than plantar fasciitis and gout who is seen in hematology clinic to discuss further evaluation and management of monoclonal protein treated on SPEP.    Patient donates plasma pretty regularly.  Recently it was noted that he had a spike in the gamma fraction and recommended checking an SPEP.  SPEP came back showing a monoclonal protein with a peak of 0.3 g/dL.  Unfortunate immunofixation was not done.  All other labs including CBC, creatinine, calcium and electrolytes along with liver function tests were all within normal limits.  He has no symptoms.  No bone pain.  No weight loss, no fever chills or night sweats.  He is not on any medications.  No recent monoclonal antibody infusions.  He had COVID about a month ago.  Symptoms lasted for a few days.  But he did not receive any monoclonal antibodies.    Never smoker.  Drinks occasionally.      Past History    Past Medical History:   Diagnosis Date     Acute gouty arthritis     Family History   Problem Relation Age of Onset     Uterine Cancer Mother       Breast Cancer Sister 53     Cerebrovascular Disease Paternal Grandfather      Retinal detachment Brother      Glaucoma No family hx of      Macular Degeneration No family hx of       Past Surgical History:   Procedure Laterality Date     DENTAL SURGERY       KNEE SURGERY Left     bone spur removed    Social History     Socioeconomic History     Marital status:      Spouse name: Not on file     Number of children: Not on file     Years of education: Not on file     Highest education level: Not on file   Occupational History     Not on file   Tobacco Use     Smoking status: Never Smoker     Smokeless tobacco: Never Used   Vaping Use     Vaping Use: Never used   Substance and Sexual Activity     Alcohol use: Yes     Comment: maybe three drinks a week     Drug use: No     Sexual activity: Yes     Partners: Female     Birth control/protection: Condom   Other Topics Concern     Parent/sibling w/ CABG, MI or angioplasty before 65F 55M? 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     Enjoys cycling and playing with son.      Social Determinants of Health     Financial Resource Strain: Not on file   Food Insecurity: Not on file   Transportation Needs: Not on file   Physical Activity: Not on file   Stress: Not on file   Social Connections: Not on file   Intimate Partner Violence: Not on file   Housing Stability: Not on file        Allergies    No Known Allergies    Review of Systems    Pertinent items are noted in HPI.      Physical Exam    Oncology Vitals 5/18/2022   Height 183 cm   Weight 83.689 kg   BSA (m2) 2.06 m2   /73   Pulse 60   Temp 98.4   Temp src -   SpO2 95   Pain Score 0   Some recent data might be hidden       General: Alert, cooperative, no distress  HEENT: Atraumatic and normocephalic  Lungs: Clear to auscultation bilaterally  CVS: S1-S2 normal,  regular rate and rhythm  Abdomen: Soft, nontender, no organomegaly  Lymphatic system: No peripheral adenopathy  Skin: No rashes  Neuro: Alert and oriented x3    Lab Results    No results found for this or any previous visit (from the past 168 hour(s)).    Imaging Results    No results found.    A total of 45 minutes was spent today on this visit including face to face conversation with the patient, EMR review (labs, imaging studies, pathology reports and outside records), counseling and care co-ordination and documentation.    Signed by: Gerry Escobar MD        Again, thank you for allowing me to participate in the care of your patient.        Sincerely,        Gerry Escobar MD

## 2022-05-18 NOTE — PROGRESS NOTES
[facility] Hematology and Oncology Consult Note    Patient: Tip Colin  MRN: [id number]  Date of Service: 05/18/2022      Reason for Visit    Chief Complaint   Patient presents with     Hematology         Assessment/Plan    Problem List Items Addressed This Visit    None     Visit Diagnoses     Abnormal SPEP        Relevant Orders    Protein Immunofixation Serum    Kappa and lambda light chain    Protein electrophoresis timed urine    Protein immunofixation urine    XR Bone Survey Complete    Abnormal blood cell count              Monoclonal gammopathy  SPEP showing monoclonal protein at a concentration of 0.3 g/dL.  Unfortunately the physician was not done and light chains were not ordered.  Suspected incidentally.  He is not symptomatic.  Pelvis no clinical evidence of myeloma.  All other labs are within normal limits.  Concern for underlying myeloma is extremely low.  We will complete the work-up with serum immunofixation, serum free light chain analysis, UPEP, skeletal survey.  Depending upon what the lab studies and schedule survey shows we will go forward.    He is agreeable with the plan.  I will see him back in 2 weeks as a virtual visit to discuss lab results.    ECOG Performance    0 - Independent    Problem List    Patient Active Problem List   Diagnosis     Nasal congestion     Plantar fasciitis     Acute gouty arthritis     ______________________________________________________________________________    Staging History    Cancer Staging  No matching staging information was found for the patient.      History of presenting illness:  Tip Colin is a 46-year-old male with no major past medical history other than plantar fasciitis and gout who is seen in hematology clinic to discuss further evaluation and management of monoclonal protein treated on SPEP.    Patient donates plasma pretty regularly.  Recently it was noted that he had a spike in the gamma fraction and recommended checking an SPEP.   SPEP came back showing a monoclonal protein with a peak of 0.3 g/dL.  Unfortunate immunofixation was not done.  All other labs including CBC, creatinine, calcium and electrolytes along with liver function tests were all within normal limits.  He has no symptoms.  No bone pain.  No weight loss, no fever chills or night sweats.  He is not on any medications.  No recent monoclonal antibody infusions.  He had COVID about a month ago.  Symptoms lasted for a few days.  But he did not receive any monoclonal antibodies.    Never smoker.  Drinks occasionally.      Past History    Past Medical History:   Diagnosis Date     Acute gouty arthritis     Family History   Problem Relation Age of Onset     Uterine Cancer Mother      Breast Cancer Sister 53     Cerebrovascular Disease Paternal Grandfather      Retinal detachment Brother      Glaucoma No family hx of      Macular Degeneration No family hx of       Past Surgical History:   Procedure Laterality Date     DENTAL SURGERY       KNEE SURGERY Left     bone spur removed    Social History     Socioeconomic History     Marital status:      Spouse name: Not on file     Number of children: Not on file     Years of education: Not on file     Highest education level: Not on file   Occupational History     Not on file   Tobacco Use     Smoking status: Never Smoker     Smokeless tobacco: Never Used   Vaping Use     Vaping Use: Never used   Substance and Sexual Activity     Alcohol use: Yes     Comment: maybe three drinks a week     Drug use: No     Sexual activity: Yes     Partners: Female     Birth control/protection: Condom   Other Topics Concern     Parent/sibling w/ CABG, MI or angioplasty before 65F 55M? 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     Enjoys cycling and playing with son.      Social Determinants of Health      Financial Resource Strain: Not on file   Food Insecurity: Not on file   Transportation Needs: Not on file   Physical Activity: Not on file   Stress: Not on file   Social Connections: Not on file   Intimate Partner Violence: Not on file   Housing Stability: Not on file        Allergies    No Known Allergies    Review of Systems    Pertinent items are noted in HPI.      Physical Exam    Oncology Vitals 5/18/2022   Height 183 cm   Weight 83.689 kg   BSA (m2) 2.06 m2   /73   Pulse 60   Temp 98.4   Temp src -   SpO2 95   Pain Score 0   Some recent data might be hidden       General: Alert, cooperative, no distress  HEENT: Atraumatic and normocephalic  Lungs: Clear to auscultation bilaterally  CVS: S1-S2 normal, regular rate and rhythm  Abdomen: Soft, nontender, no organomegaly  Lymphatic system: No peripheral adenopathy  Skin: No rashes  Neuro: Alert and oriented x3    Lab Results    No results found for this or any previous visit (from the past 168 hour(s)).    Imaging Results    No results found.    A total of 45 minutes was spent today on this visit including face to face conversation with the patient, EMR review (labs, imaging studies, pathology reports and outside records), counseling and care co-ordination and documentation.    Signed by: Gerry Escobar MD

## 2022-05-19 LAB
KAPPA LC FREE SER-MCNC: 1.7 MG/DL (ref 0.33–1.94)
KAPPA LC FREE/LAMBDA FREE SER NEPH: 1.91 {RATIO} (ref 0.26–1.65)
LAMBDA LC FREE SERPL-MCNC: 0.89 MG/DL (ref 0.57–2.63)

## 2022-05-20 ENCOUNTER — APPOINTMENT (OUTPATIENT)
Dept: INFUSION THERAPY | Facility: HOSPITAL | Age: 47
End: 2022-05-20
Attending: INTERNAL MEDICINE
Payer: COMMERCIAL

## 2022-05-20 PROCEDURE — 84166 PROTEIN E-PHORESIS/URINE/CSF: CPT | Mod: 26 | Performed by: PATHOLOGY

## 2022-05-20 PROCEDURE — 84166 PROTEIN E-PHORESIS/URINE/CSF: CPT | Performed by: INTERNAL MEDICINE

## 2022-05-24 ENCOUNTER — PRE VISIT (OUTPATIENT)
Dept: ONCOLOGY | Facility: HOSPITAL | Age: 47
End: 2022-05-24

## 2022-05-24 LAB
PATH ICD:: NORMAL
PROT PATTERN SERPL IFE-IMP: NORMAL
REVIEWING PATHOLOGIST: NORMAL

## 2022-05-25 LAB
PATH ICD:: NORMAL
PROT ELPH PNL UR ELPH: NORMAL
REVIEWING PATHOLOGIST: NORMAL

## 2022-05-26 LAB
PATH ICD:: NORMAL
PROT PATTERN UR ELPH-IMP: NORMAL
PROTEIN TOTAL TIMED URINE MG/SPEC LHE: NORMAL
REVIEWING PATHOLOGIST: NORMAL
SPECIMEN VOL UR: 1100 ML

## 2022-05-27 ENCOUNTER — VIRTUAL VISIT (OUTPATIENT)
Dept: ONCOLOGY | Facility: HOSPITAL | Age: 47
End: 2022-05-27
Attending: INTERNAL MEDICINE
Payer: COMMERCIAL

## 2022-05-27 DIAGNOSIS — D47.2 MGUS (MONOCLONAL GAMMOPATHY OF UNKNOWN SIGNIFICANCE): Primary | ICD-10-CM

## 2022-05-27 PROCEDURE — 99215 OFFICE O/P EST HI 40 MIN: CPT | Mod: 95 | Performed by: INTERNAL MEDICINE

## 2022-05-27 PROCEDURE — G0463 HOSPITAL OUTPT CLINIC VISIT: HCPCS | Mod: PN,RTG | Performed by: INTERNAL MEDICINE

## 2022-05-27 RX ORDER — FAMOTIDINE 20 MG
1 TABLET ORAL DAILY
COMMUNITY

## 2022-05-27 ASSESSMENT — PAIN SCALES - GENERAL: PAINLEVEL: NO PAIN (0)

## 2022-05-27 NOTE — PROGRESS NOTES
Video-Visit Details    Type of service:  Video Visit    Video Start Time: 3:23 PM  Video End Time: 4 PM    Originating Location (pt. Location): Home in [place]    Distant Location (provider location):  [facility]    Platform used for Video Visit: [facility] Hematology and Oncology Progress Note    Patient: Tip Colin  MRN: [id number]  Date of Service: 05/27/2022        Reason for Visit    Chief Complaint   Patient presents with     Hematology     Video Visit Return with bone survey results related to Abnormal SPEP         Problem List Items Addressed This Visit    None     Visit Diagnoses     MGUS (monoclonal gammopathy of unknown significance)    -  Primary    Relevant Orders    Protein electrophoresis    Kappa and lambda light chain    Protein Immunofixation Serum    IgM    Creatinine    Calcium    Bilirubin,  total    AST    ALT    CBC with platelets and differential            Assessment and Plan  1. IgM kappa MGUS  His SPEP showed an IgM kappa monoclonal protein at a concentration of 0.3 g/dL.  I ordered kappa lambda free light chains which came back normal.  [place] free light chain was at 1.7 mg/dL and lambda was 0.89 mg/dL.  However kappa lambda ratio was elevated at 1.91.  I also ordered 24-hour urine protein electrophoresis.  There was no evidence of any monoclonal protein in the urine.  I also obtained a skeletal survey which showed no lytic lesions.  I discussed extensively regarding the diagnosis of IgM kappa monoclonal gammopathy, its prognosis, the natural course of the disease and treatment options available if he were to progress.  I explained to him that most IgM monoclonal gammopathies turnout, if anything, to be low-grade lymphoma or stay as monoclonal gammopathy of unknown significance.  The most common low-grade lymphomas which can make IgM monoclonal proteins include lymphoplasmacytic lymphoma and marginal zone lymphoma.  But practically any low-grade  lymphoma can make an IgM monoclonal protein.  The likelihood of him progressing to multiple myeloma is very low since IgM myeloma is extremely rare.  Although his kappa lambda ratio is a little bit elevated light chains themselves are within normal limits.  He has no B symptoms.  I do not think there is any indication to do a bone marrow biopsy at this point in time.  Recommendation would be to recheck his SPEP in 3 months or so to see if it is getting worse.  If his monoclonal protein tends to go up above 0.5 g/dL then definitely I would recommend a bone marrow biopsy and or systemic imaging to look for lymphoma.  I explained to him and his wife that even this turns out to be a low-grade lymphoma like LPL or marginal zone lymphoma, in the absence of B symptoms or other clinical and lab indications, there is no need to start any treatment at this point in time.  We reviewed the potential signs and symptoms to look for if this were to get worse including signs of hyperviscosity like headaches, blurred vision, blood clots and symptoms of lymphoma getting like night sweats, fevers, weight loss or enlarging lymph nodes.    He and his wife are in agreement with the plan.  I will repeat his labs and see him back in 3 months.  I will recheck his SPEP, creatinine, calcium, electrolytes, LFTs, IgM level and free light chains along with CBC.    Cancer Staging  No matching staging information was found for the patient.    ECOG Performance    0 - Independent         Problem List    Patient Active Problem List   Diagnosis     Nasal congestion     Plantar fasciitis     Acute gouty arthritis        Interval History   Tip Colin is a 46 year old with recent diagnosis of IgM kappa MGUS who is seen as a video visit to discuss lab results and make further follow-up plans.    No new issues since last visit.  Denies any bone pain.  No tingling or numbness in the hands and feet.  No headaches or blurred vision.  No weight loss.  No  night sweats.  No enlarged lymph nodes.  No fevers or chills.        Review of Systems  A comprehensive review of systems was negative except for what is noted in the interval history    Current Outpatient Medications   Medication     Multiple Vitamins-Minerals (CENTRUM ADULTS PO)     Vitamin D, Cholecalciferol, 25 MCG (1000 UT) CAPS     No current facility-administered medications for this visit.        Physical Exam    No flowsheet data found.    General: alert and cooperative   CNS: Alert and oriented x3, neurologic exam grossly normal.    Lab Results    No results found for this or any previous visit (from the past 168 hour(s)).    Imaging    XR Bone Survey Complete    Result Date: 5/18/2022  EXAM: XR BONE SURVEY COMPLETE LOCATION: [facility] DATE/TIME: 5/18/2022 4:22 PM INDICATION:  Abnormal SPEP COMPARISON: None.     IMPRESSION: No lytic lesions. No evidence of myeloma.     A total of 45 min were spent today on this visit which included face to face conversation with the patient, EMR review, counseling and co-ordination of care and medical documentation.      Signed by: Gerry Escobar MD

## 2022-08-05 ENCOUNTER — MYC MEDICAL ADVICE (OUTPATIENT)
Dept: ONCOLOGY | Facility: HOSPITAL | Age: 47
End: 2022-08-05

## 2022-08-19 ENCOUNTER — TELEPHONE (OUTPATIENT)
Dept: INFUSION THERAPY | Facility: HOSPITAL | Age: 47
End: 2022-08-19

## 2022-08-30 ENCOUNTER — PATIENT OUTREACH (OUTPATIENT)
Dept: ONCOLOGY | Facility: HOSPITAL | Age: 47
End: 2022-08-30

## 2022-09-02 ENCOUNTER — PATIENT OUTREACH (OUTPATIENT)
Dept: ONCOLOGY | Facility: CLINIC | Age: 47
End: 2022-09-02

## 2022-09-06 ENCOUNTER — PATIENT OUTREACH (OUTPATIENT)
Dept: ONCOLOGY | Facility: HOSPITAL | Age: 47
End: 2022-09-06

## 2022-09-06 NOTE — PROGRESS NOTES
Writer recd request to address pt's wife's call to [facility] for options for transfer of hematology care.   Her main concern was that pt was not scheduled for his 3 month lab/MD visit until she messaged to ask about it and then was scheduled for appts but not notified directly of when they were and they didn't work for his work schedule. Her family member sees another MD at [facility] for a different dx.  Mutually agreed upon Plan:   [facility] scheduling to call her directly on her cell phone to arrange a lab appt on Monday 9/12 (he is off on Sundays and Mondays)and schedule with Dr Escobar later that week for the 3 month RTN visit.   Request forwarded to [facility] and [facility] to coordinate those appts.   She understands that Dr Escobar won't ever be able to see pt (due to his current clinic schedule) for in person visits on Mondays but agrees that with enough advance notice Tip will be able to ask off for work for the visit.  Isi Hamlin, RN, BSN, OCN  Hematology/Oncology Nurse Navigator  [facility]  [phone]

## 2022-09-06 NOTE — PROGRESS NOTES
"[facility]: Cancer Care                                                                                        RN Cancer Care Coordinator received note from navigation team regarding scheduling for this patient. Included was information from pt's wife about requests for scheduling. Writer does not see Permission to Share Protected Healthcare Information form in media, and so, called patient to determine his desire about getting appointments scheduled, and also about communication.     Writer clarified the practices of Dr. Chen and Dr. Escobar - as he had some confusion about their specialties. He understands that Dr. Chen is a breast cancer doctor. Writer explained that Dr. Escobar is medical oncology - he can treat breast cancer, but specializes in hematology.     He selected lab appointment for next Monday, 9/12, at [facility] at 10:15 AM. Virtual visit was selected for Friday 9/16 at 12:30PM. Assured him that his wife can join him for this appt. Writer explained that if he wants to see Dr. Escobar in person on a Monday (pt's day off work) he can schedule at the [facility] (~30 miles north). He states that he wouldn't mind driving this distance sometimes. Writer let him know that this can be scheduled for his next 3 month visit if he wants.     Writer described the Permission to Share Protected Healthcare Information form (we call it Consent to Communicate) and that I couldn't find it in chart. Described the duty to utilize this kind of document. This is why writer did not return call from pt's wife. He explains that he is \"ok\" with us talking with his wife, and also that she has a different level of urgency about his appointments than he does. He indicated that he understood from Dr. Escobar that this was not an urgent and worrisome problem. Writer confirmed this, and re-read Dr. Escobar's note from most recent visit - that Myeloma would be very rare, and listed the symptoms patient should be aware of " and communicate about. Pt verbalized understanding.     Writer let him know that we will have him complete the Permission form when he comes for labs.     Signature:  Gale Borges RN

## 2022-09-12 ENCOUNTER — LAB (OUTPATIENT)
Dept: INFUSION THERAPY | Facility: HOSPITAL | Age: 47
End: 2022-09-12
Attending: INTERNAL MEDICINE
Payer: COMMERCIAL

## 2022-09-12 DIAGNOSIS — D47.2 MGUS (MONOCLONAL GAMMOPATHY OF UNKNOWN SIGNIFICANCE): ICD-10-CM

## 2022-09-12 LAB
ALT SERPL W P-5'-P-CCNC: 26 U/L (ref 0–45)
AST SERPL W P-5'-P-CCNC: 20 U/L (ref 0–40)
BASOPHILS # BLD AUTO: 0.1 10E3/UL (ref 0–0.2)
BASOPHILS NFR BLD AUTO: 1 %
BILIRUB SERPL-MCNC: 0.9 MG/DL (ref 0–1)
CALCIUM SERPL-MCNC: 9.1 MG/DL (ref 8.5–10.5)
CREAT SERPL-MCNC: 0.83 MG/DL (ref 0.7–1.3)
EOSINOPHIL # BLD AUTO: 0.1 10E3/UL (ref 0–0.7)
EOSINOPHIL NFR BLD AUTO: 2 %
ERYTHROCYTE [DISTWIDTH] IN BLOOD BY AUTOMATED COUNT: 11.9 % (ref 10–15)
GFR SERPL CREATININE-BSD FRML MDRD: >90 ML/MIN/1.73M2
HCT VFR BLD AUTO: 42.3 % (ref 40–53)
HGB BLD-MCNC: 15.1 G/DL (ref 13.3–17.7)
IMM GRANULOCYTES # BLD: 0 10E3/UL
IMM GRANULOCYTES NFR BLD: 1 %
LYMPHOCYTES # BLD AUTO: 1.7 10E3/UL (ref 0.8–5.3)
LYMPHOCYTES NFR BLD AUTO: 27 %
MCH RBC QN AUTO: 32 PG (ref 26.5–33)
MCHC RBC AUTO-ENTMCNC: 35.7 G/DL (ref 31.5–36.5)
MCV RBC AUTO: 90 FL (ref 78–100)
MONOCYTES # BLD AUTO: 0.5 10E3/UL (ref 0–1.3)
MONOCYTES NFR BLD AUTO: 7 %
NEUTROPHILS # BLD AUTO: 4.1 10E3/UL (ref 1.6–8.3)
NEUTROPHILS NFR BLD AUTO: 62 %
NRBC # BLD AUTO: 0 10E3/UL
NRBC BLD AUTO-RTO: 0 /100
PLATELET # BLD AUTO: 244 10E3/UL (ref 150–450)
RBC # BLD AUTO: 4.72 10E6/UL (ref 4.4–5.9)
TOTAL PROTEIN SERUM FOR ELP: 6.8 G/DL (ref 6.4–8.3)
WBC # BLD AUTO: 6.5 10E3/UL (ref 4–11)

## 2022-09-12 PROCEDURE — 84155 ASSAY OF PROTEIN SERUM: CPT

## 2022-09-12 PROCEDURE — 84165 PROTEIN E-PHORESIS SERUM: CPT | Mod: TC | Performed by: PATHOLOGY

## 2022-09-12 PROCEDURE — 82784 ASSAY IGA/IGD/IGG/IGM EACH: CPT

## 2022-09-12 PROCEDURE — 83521 IG LIGHT CHAINS FREE EACH: CPT

## 2022-09-12 PROCEDURE — 36415 COLL VENOUS BLD VENIPUNCTURE: CPT

## 2022-09-12 PROCEDURE — 85025 COMPLETE CBC W/AUTO DIFF WBC: CPT

## 2022-09-12 PROCEDURE — 82565 ASSAY OF CREATININE: CPT

## 2022-09-12 PROCEDURE — 86334 IMMUNOFIX E-PHORESIS SERUM: CPT | Performed by: PATHOLOGY

## 2022-09-12 PROCEDURE — 82247 BILIRUBIN TOTAL: CPT

## 2022-09-12 PROCEDURE — 82310 ASSAY OF CALCIUM: CPT

## 2022-09-12 PROCEDURE — 84460 ALANINE AMINO (ALT) (SGPT): CPT

## 2022-09-12 PROCEDURE — 84450 TRANSFERASE (AST) (SGOT): CPT

## 2022-09-13 LAB
ALBUMIN SERPL ELPH-MCNC: 4.3 G/DL (ref 3.7–5.1)
ALPHA1 GLOB SERPL ELPH-MCNC: 0.2 G/DL (ref 0.2–0.4)
ALPHA2 GLOB SERPL ELPH-MCNC: 0.5 G/DL (ref 0.5–0.9)
B-GLOBULIN SERPL ELPH-MCNC: 0.6 G/DL (ref 0.6–1)
GAMMA GLOB SERPL ELPH-MCNC: 1.1 G/DL (ref 0.7–1.6)
IGM SERPL-MCNC: 570 MG/DL (ref 35–242)
KAPPA LC FREE SER-MCNC: 1.76 MG/DL (ref 0.33–1.94)
KAPPA LC FREE/LAMBDA FREE SER NEPH: 1.74 {RATIO} (ref 0.26–1.65)
LAMBDA LC FREE SERPL-MCNC: 1.01 MG/DL (ref 0.57–2.63)
M PROTEIN SERPL ELPH-MCNC: 0.4 G/DL
PROT PATTERN SERPL ELPH-IMP: ABNORMAL
PROT PATTERN SERPL IFE-IMP: NORMAL

## 2022-09-13 PROCEDURE — 84165 PROTEIN E-PHORESIS SERUM: CPT | Mod: 26 | Performed by: PATHOLOGY

## 2022-09-13 PROCEDURE — 86334 IMMUNOFIX E-PHORESIS SERUM: CPT | Mod: 26 | Performed by: PATHOLOGY

## 2022-09-16 ENCOUNTER — TELEPHONE (OUTPATIENT)
Dept: ONCOLOGY | Facility: HOSPITAL | Age: 47
End: 2022-09-16

## 2022-09-16 ENCOUNTER — VIRTUAL VISIT (OUTPATIENT)
Dept: ONCOLOGY | Facility: HOSPITAL | Age: 47
End: 2022-09-16
Attending: INTERNAL MEDICINE
Payer: COMMERCIAL

## 2022-09-16 DIAGNOSIS — D47.2 MGUS (MONOCLONAL GAMMOPATHY OF UNKNOWN SIGNIFICANCE): Primary | ICD-10-CM

## 2022-09-16 PROCEDURE — 99214 OFFICE O/P EST MOD 30 MIN: CPT | Mod: 95 | Performed by: INTERNAL MEDICINE

## 2022-09-16 PROCEDURE — G0463 HOSPITAL OUTPT CLINIC VISIT: HCPCS | Mod: PN,RTG | Performed by: INTERNAL MEDICINE

## 2022-09-16 NOTE — PROGRESS NOTES
Video Visit - Oncology Rooming Note    September 16, 2022 11:50 AM   Tip Colin is a 47 year old male who presents for:    Chief Complaint   Patient presents with     Oncology Clinic Visit     Return MGUS (monoclonal gammopathy of unknown significance), review labs from 9/12     Initial Vitals: There were no vitals taken for this visit. Estimated body mass index is 25.02 kg/m  as calculated from the following:    Height as of 5/18/22: 1.829 m (6').    Weight as of 5/18/22: 83.7 kg (184 lb 8 oz). There is no height or weight on file to calculate BSA.  Data Unavailable Comment: Data Unavailable   No LMP for male patient.  Allergies reviewed: Yes  Medications reviewed: Yes    Medications: Medication refills not needed today.  Pharmacy name entered into Ze[facility]:    [id number] CLOSED - [place], [place] - [address] AT [facility] [id number] [facility] [employer] - [place], [place] - [address]    Clinical concerns:Return MGUS (monoclonal gammopathy of unknown significance), review labs from 9/12.       Jayla Sen, CMA

## 2022-09-17 ENCOUNTER — HEALTH MAINTENANCE LETTER (OUTPATIENT)
Age: 47
End: 2022-09-17

## 2022-09-19 RX ORDER — OXYCODONE AND ACETAMINOPHEN 5; 325 MG/1; MG/1
1 TABLET ORAL ONCE
Status: CANCELLED | OUTPATIENT
Start: 2022-09-19 | End: 2022-09-19

## 2022-09-19 RX ORDER — LIDOCAINE HYDROCHLORIDE 10 MG/ML
10 INJECTION, SOLUTION EPIDURAL; INFILTRATION; INTRACAUDAL; PERINEURAL ONCE
Status: CANCELLED | OUTPATIENT
Start: 2022-09-19 | End: 2022-09-19

## 2022-09-23 ENCOUNTER — LAB (OUTPATIENT)
Dept: INFUSION THERAPY | Facility: HOSPITAL | Age: 47
End: 2022-09-23
Attending: INTERNAL MEDICINE
Payer: COMMERCIAL

## 2022-09-23 ENCOUNTER — PROCEDURE ONLY VISIT (OUTPATIENT)
Dept: ONCOLOGY | Facility: HOSPITAL | Age: 47
End: 2022-09-23
Attending: INTERNAL MEDICINE
Payer: COMMERCIAL

## 2022-09-23 VITALS
WEIGHT: 180.2 LBS | TEMPERATURE: 97.7 F | OXYGEN SATURATION: 94 % | SYSTOLIC BLOOD PRESSURE: 103 MMHG | HEART RATE: 48 BPM | BODY MASS INDEX: 24.41 KG/M2 | HEIGHT: 72 IN | RESPIRATION RATE: 12 BRPM | DIASTOLIC BLOOD PRESSURE: 56 MMHG

## 2022-09-23 DIAGNOSIS — D47.2 MGUS (MONOCLONAL GAMMOPATHY OF UNKNOWN SIGNIFICANCE): ICD-10-CM

## 2022-09-23 LAB
BASOPHILS # BLD AUTO: 0 10E3/UL (ref 0–0.2)
BASOPHILS NFR BLD AUTO: 1 %
EOSINOPHIL # BLD AUTO: 0.1 10E3/UL (ref 0–0.7)
EOSINOPHIL NFR BLD AUTO: 2 %
ERYTHROCYTE [DISTWIDTH] IN BLOOD BY AUTOMATED COUNT: 11.9 % (ref 10–15)
HCT VFR BLD AUTO: 41.1 % (ref 40–53)
HGB BLD-MCNC: 14.3 G/DL (ref 13.3–17.7)
IMM GRANULOCYTES # BLD: 0 10E3/UL
IMM GRANULOCYTES NFR BLD: 0 %
LYMPHOCYTES # BLD AUTO: 1.3 10E3/UL (ref 0.8–5.3)
LYMPHOCYTES NFR BLD AUTO: 19 %
MCH RBC QN AUTO: 32.1 PG (ref 26.5–33)
MCHC RBC AUTO-ENTMCNC: 34.8 G/DL (ref 31.5–36.5)
MCV RBC AUTO: 92 FL (ref 78–100)
MONOCYTES # BLD AUTO: 0.6 10E3/UL (ref 0–1.3)
MONOCYTES NFR BLD AUTO: 9 %
NEUTROPHILS # BLD AUTO: 4.9 10E3/UL (ref 1.6–8.3)
NEUTROPHILS NFR BLD AUTO: 69 %
NRBC # BLD AUTO: 0 10E3/UL
NRBC BLD AUTO-RTO: 0 /100
PLATELET # BLD AUTO: 195 10E3/UL (ref 150–450)
RBC # BLD AUTO: 4.45 10E6/UL (ref 4.4–5.9)
WBC # BLD AUTO: 7 10E3/UL (ref 4–11)

## 2022-09-23 PROCEDURE — 88264 CHROMOSOME ANALYSIS 20-25: CPT | Performed by: INTERNAL MEDICINE

## 2022-09-23 PROCEDURE — 85025 COMPLETE CBC W/AUTO DIFF WBC: CPT

## 2022-09-23 PROCEDURE — 88313 SPECIAL STAINS GROUP 2: CPT | Mod: TC | Performed by: INTERNAL MEDICINE

## 2022-09-23 PROCEDURE — 88368 INSITU HYBRIDIZATION MANUAL: CPT | Mod: 26 | Performed by: MEDICAL GENETICS

## 2022-09-23 PROCEDURE — 88185 FLOWCYTOMETRY/TC ADD-ON: CPT | Performed by: INTERNAL MEDICINE

## 2022-09-23 PROCEDURE — 88305 TISSUE EXAM BY PATHOLOGIST: CPT | Mod: TC | Performed by: INTERNAL MEDICINE

## 2022-09-23 PROCEDURE — 88275 CYTOGENETICS 100-300: CPT | Performed by: INTERNAL MEDICINE

## 2022-09-23 PROCEDURE — 250N000013 HC RX MED GY IP 250 OP 250 PS 637: Performed by: INTERNAL MEDICINE

## 2022-09-23 PROCEDURE — 88291 CYTO/MOLECULAR REPORT: CPT | Performed by: MEDICAL GENETICS

## 2022-09-23 PROCEDURE — 88184 FLOWCYTOMETRY/ TC 1 MARKER: CPT | Performed by: INTERNAL MEDICINE

## 2022-09-23 PROCEDURE — 88237 TISSUE CULTURE BONE MARROW: CPT | Performed by: INTERNAL MEDICINE

## 2022-09-23 PROCEDURE — 38222 DX BONE MARROW BX & ASPIR: CPT | Performed by: INTERNAL MEDICINE

## 2022-09-23 PROCEDURE — 88188 FLOWCYTOMETRY/READ 9-15: CPT | Performed by: PATHOLOGY

## 2022-09-23 PROCEDURE — 36415 COLL VENOUS BLD VENIPUNCTURE: CPT

## 2022-09-23 RX ORDER — OXYCODONE AND ACETAMINOPHEN 5; 325 MG/1; MG/1
1 TABLET ORAL ONCE
Status: COMPLETED | OUTPATIENT
Start: 2022-09-23 | End: 2022-09-23

## 2022-09-23 RX ORDER — DIPHENHYDRAMINE HCL 25 MG
CAPSULE ORAL PRN
COMMUNITY
End: 2023-10-02

## 2022-09-23 RX ADMIN — OXYCODONE HYDROCHLORIDE AND ACETAMINOPHEN 1 TABLET: 5; 325 TABLET ORAL at 11:28

## 2022-09-23 ASSESSMENT — PAIN SCALES - GENERAL
PAINLEVEL: NO PAIN (0)
PAINLEVEL: NO PAIN (0)

## 2022-09-23 NOTE — PROGRESS NOTES
RN Cancer Care Coordinator provided teaching for BMBx.   Pt verbalizes understanding.  Pre med given.

## 2022-09-23 NOTE — PROGRESS NOTES
Video-Visit Details    Type of service:  Video Visit    Video Start Time: 12:30 PM  Video End Time: 12:55 PM    Originating Location (pt. Location): Home in [place]    Distant Location (provider location):  [facility]/[place]    Platform used for Video Visit: [facility] Hematology and Oncology Progress Note    Patient: Tip Colin  MRN: [id number]  Date of Service: 05/27/2022        Reason for Visit    Chief Complaint   Patient presents with     Oncology Clinic Visit     Return MGUS (monoclonal gammopathy of unknown significance), review labs from 9/12         Problem List Items Addressed This Visit    None     Visit Diagnoses     MGUS (monoclonal gammopathy of unknown significance)    -  Primary    Relevant Orders    Bone marrow biopsy/aspiration    Flow Cytometry Bone Marrow            Assessment and Plan  1. IgM kappa MGUS  Repeat labs from last week reviewed today.  SPEP shows mild increase in the total monoclonal protein level.  It has come up from 0.3 g/dL to 0.4 g/dL.  Quantitative IgM is at 570mg/dL.  Kappa lambda free light chains are within normal limits but the ratio is mildly elevated at 1.74.  CBC is normal.  Creatinine is normal.  Calcium is normal.  However he has developed some mild B symptoms.  Weight cough progressively increasing monoclonal protein and some symptoms I think we should evaluate this further with a bone marrow biopsy.  Recently had discussed about the possible etiologies.  This could turn out to be a low-grade lymphoma like marginal zone lymphoma or lymphoplasmacytic lymphoma.  IgM myeloma is pretty rare.  However it cannot be completely excluded.  I reviewed the bone marrow biopsy procedure, the rationale behind it, the complications associated with it in detail today.  He is agreeable to proceed with bone marrow biopsy.  We will schedule this sometime next week.        Cancer Staging  No matching staging information was found for the  patient.    ECOG Performance    0 - Independent         Problem List    Patient Active Problem List   Diagnosis     Nasal congestion     Plantar fasciitis     Acute gouty arthritis        Interval History   Tip Colin is a 46 year old with recent diagnosis of IgM kappa MGUS who is seen as a video visit to discuss lab results and make further follow-up plans.    Doing generally well since last visit.  He has developed some mild B symptoms.  Some night sweats.  No enlarging lymph nodes reported.  Also has some fatigue.  Denies any peripheral neuropathy symptoms.  No fevers.    Labs reviewed today.    Review of Systems  A comprehensive review of systems was negative except for what is noted in the interval history    Current Outpatient Medications   Medication     Multiple Vitamins-Minerals (CENTRUM ADULTS PO)     Vitamin D (Cholecalciferol) 25 MCG (1000 UT) CAPS     No current facility-administered medications for this visit.        Physical Exam    No flowsheet data found.    General: alert and cooperative   CNS: Alert and oriented x3, neurologic exam grossly normal.    Lab Results    No results found for this or any previous visit (from the past 168 hour(s)).    Imaging    No results found.     A total of 30 min were spent today on this visit which included face to face conversation with the patient, EMR review, counseling and co-ordination of care and medical documentation.      Signed by: Gerry Escobar MD

## 2022-09-23 NOTE — PROGRESS NOTES
11:15   RN Cancer Care Coordinator did follow up teaching s/p BMBx.     Vital signs stable. Dressing cdi.  Stable on feet, able to walk safely.   Stopped at desk to reschedule RTC.   Sent home with wife.

## 2022-09-26 LAB
PATH REPORT.COMMENTS IMP SPEC: NORMAL
PATH REPORT.FINAL DX SPEC: NORMAL
PATH REPORT.MICROSCOPIC SPEC OTHER STN: NORMAL
PATH REPORT.RELEVANT HX SPEC: NORMAL

## 2022-09-28 PROCEDURE — 88365 INSITU HYBRIDIZATION (FISH): CPT | Mod: 26 | Performed by: PATHOLOGY

## 2022-09-28 PROCEDURE — 88342 IMHCHEM/IMCYTCHM 1ST ANTB: CPT | Mod: 26 | Performed by: PATHOLOGY

## 2022-09-28 PROCEDURE — 85097 BONE MARROW INTERPRETATION: CPT | Performed by: PATHOLOGY

## 2022-09-28 PROCEDURE — 88341 IMHCHEM/IMCYTCHM EA ADD ANTB: CPT | Mod: 26 | Performed by: PATHOLOGY

## 2022-09-28 PROCEDURE — 88311 DECALCIFY TISSUE: CPT | Mod: 26 | Performed by: PATHOLOGY

## 2022-09-28 PROCEDURE — 88313 SPECIAL STAINS GROUP 2: CPT | Mod: 26 | Performed by: PATHOLOGY

## 2022-09-28 PROCEDURE — 88305 TISSUE EXAM BY PATHOLOGIST: CPT | Mod: 26 | Performed by: PATHOLOGY

## 2022-09-28 PROCEDURE — 88364 INSITU HYBRIDIZATION (FISH): CPT | Mod: 26 | Performed by: PATHOLOGY

## 2022-09-29 LAB — INTERPRETATION: NORMAL

## 2022-10-03 LAB
CULTURE HARVEST COMPLETE DATE: NORMAL
INTERPRETATION: NORMAL

## 2022-10-05 ENCOUNTER — VIRTUAL VISIT (OUTPATIENT)
Dept: ONCOLOGY | Facility: HOSPITAL | Age: 47
End: 2022-10-05
Attending: INTERNAL MEDICINE
Payer: COMMERCIAL

## 2022-10-05 DIAGNOSIS — D47.2 MGUS (MONOCLONAL GAMMOPATHY OF UNKNOWN SIGNIFICANCE): Primary | ICD-10-CM

## 2022-10-05 PROCEDURE — 99214 OFFICE O/P EST MOD 30 MIN: CPT | Mod: 95 | Performed by: INTERNAL MEDICINE

## 2022-10-05 PROCEDURE — G0463 HOSPITAL OUTPT CLINIC VISIT: HCPCS | Mod: PN,RTG | Performed by: INTERNAL MEDICINE

## 2022-10-05 NOTE — PROGRESS NOTES
Tip is a 47 year old who is being evaluated via a billable video visit.      How would you like to obtain your AVS? MyChart  If the video visit is dropped, the invitation should be resent by: Text to cell phone: [phone]  Will anyone else be joining your video visit? Yes: wife. How would they like to receive their invitation? Text to cell phone: [phone]        Video-Visit Details    Video Start Time: 10:44 AM    Type of service:  Video Visit    Video End Time:    Originating Location (pt. Location): Home    Distant Location (provider location):  [facility]     Platform used for Video Visit: Kaitlynn Spears LPN on 10/5/2022 at 10:44 AM

## 2022-10-05 NOTE — PROGRESS NOTES
Video-Visit Details    Type of service:  Video Visit    Video Start Time: 11:05 AM  Video End Time: 11:30 AM    Originating Location (pt. Location): Home in [place]    Distant Location (provider location):  [facility]/[place]    Platform used for Video Visit: [facility] Hematology and Oncology Progress Note    Patient: Tip Colin  MRN: [id number]  Date of Service: 05/27/2022        Reason for Visit    Chief Complaint   Patient presents with     Oncology Clinic Visit     1 Week MD OV after BMBX         Problem List Items Addressed This Visit        Immune    MGUS (monoclonal gammopathy of unknown significance) - Primary            Assessment and Plan  1. IgM kappa MGUS  I reviewed the bone marrow biopsy report in detail today.  It is showing normocellular marrow with about 5 to 10% plasma cells.  He also has a polyclonal 10-20% B cells.  Slightly decreased iron stores.  Flow cytometry showed mainly polytypic plasma cells.  No immunophenotypic evidence of B-cell lymphoma.  However there was a slight skew towards increased kappa light chain expression, with a kappa to lambda ratio of 6:1.  This is below threshold of 10: 1 to be considered as true plasma cell dyscrasia.  However there is suspicion that there could be a potential underlying plasma proliferative disorder which would be consistent with a monoclonal gammopathy of unknown significance diagnosis.  There is no evidence to say that he has multiple myeloma or a low-grade lymphoma like LPL or marginal zone lymphoma at this point in time.  Clinically he is doing well without any major signs or symptoms of plasma or lymphoproliferative disorder.  He does have some occasional night sweats but its not consistent with lymphoma.  No weight loss or fevers or enlarging lymph nodes.  He did have some lymph node enlargement in the neck which correlated with a possible upper respiratory infection.  He has minimally elevated IgM level at  570.  No signs or symptoms of hyperviscosity, which is typically not expected at this level.  No neuropathy symptoms.    So at this point in time I recommended continued observation with repeat labs in 3 months.  If he were to develop any B symptoms or progressive increase in the monoclonal protein level then potentially we could investigate further.  I will probably start with a CT or PET scan to look for evidence of lymphoma.  May also need a repeat bone marrow biopsy in the future.        Cancer Staging  No matching staging information was found for the patient.    ECOG Performance    0 - Independent         Problem List    Patient Active Problem List   Diagnosis     Nasal congestion     Plantar fasciitis     Acute gouty arthritis     MGUS (monoclonal gammopathy of unknown significance)        Interval History   Tip Colin is a 46 year old with recent diagnosis of IgM kappa MGUS who is seen as a video visit to discuss bone marrow biopsy results make further follow-up plans.    Doing well with bone marrow biopsy.  No major side effects reported.  Occasional night sweats.  No enlarged lymph nodes.  No fevers.  No weight loss.    Labs reviewed today.    Review of Systems  A comprehensive review of systems was negative except for what is noted in the interval history    Current Outpatient Medications   Medication     Dextromethorphan HBr (VICKS DAYQUIL COUGH) 15 MG/15ML LIQD     Multiple Vitamins-Minerals (CENTRUM ADULTS PO)     Vitamin D (Cholecalciferol) 25 MCG (1000 UT) CAPS     No current facility-administered medications for this visit.        Physical Exam    No flowsheet data found.    General: alert and cooperative   CNS: Alert and oriented x3, neurologic exam grossly normal.    Lab Results    No results found for this or any previous visit (from the past 168 hour(s)).    Imaging    No results found.     A total of 30 min were spent today on this visit which included face to face conversation with the  patient, EMR review, counseling and co-ordination of care and medical documentation.      Signed by: Gerry Escobar MD

## 2022-10-06 NOTE — PROGRESS NOTES
"Adult Bone Marrow Biopsy Procedure Note  October 6, 2022  /56 (BP Location: Right arm, Patient Position: Supine, Cuff Size: Adult Regular)   Pulse (!) 48   Temp 97.7  F (36.5  C) (Oral)   Resp 12   Ht 1.829 m (6' 0.01\")   Wt 81.7 kg (180 lb 3.2 oz)   SpO2 94%   BMI 24.43 kg/m       DIAGNOSIS: Viral cardiomyopathy of unknown significance    PROCEDURE: Unilateral Bone Marrow Biopsy    LOCATION: [facility]    Patient s identification was positively verified by verbal identification and invasive procedure safety checklist was completed. Informed consent was obtained. Following the administration of Oxycodone as pre-medication, patient was placed in the prone position and prepped and draped in a sterile manner. Approximately 6 cc of 1% Lidocaine was used over the left posterior iliac spine. Following this a 3 mm incision was made. Trephine bone marrow core(s) was (were) obtained from the [facility]. Bone marrow aspirates were obtained from the [facility]. Aspirates were sent for morphology, immunophenotyping and cytogenetics. A total of approximately 12 ml of marrow was aspirated. Following this procedure a sterile dressing was applied to the bone marrow biopsy site(s). The patient was placed in the supine position to maintain pressure on the biopsy site. Post-procedure wound care instructions were given.     Complications: NO    Interventions: NO    Length of procedure:21 minutes to 45 minutes    Procedure performed by: Gerry Escobar MD       "

## 2022-10-17 ENCOUNTER — OFFICE VISIT (OUTPATIENT)
Dept: DERMATOLOGY | Facility: CLINIC | Age: 47
End: 2022-10-17
Attending: FAMILY MEDICINE
Payer: COMMERCIAL

## 2022-10-17 DIAGNOSIS — D22.9 MULTIPLE BENIGN NEVI: Primary | ICD-10-CM

## 2022-10-17 DIAGNOSIS — L57.8 ACTINIC SKIN DAMAGE: ICD-10-CM

## 2022-10-17 DIAGNOSIS — D23.9 DERMATOFIBROMA: ICD-10-CM

## 2022-10-17 DIAGNOSIS — D22.9 NUMEROUS SKIN MOLES: ICD-10-CM

## 2022-10-17 PROCEDURE — 99203 OFFICE O/P NEW LOW 30 MIN: CPT | Performed by: STUDENT IN AN ORGANIZED HEALTH CARE EDUCATION/TRAINING PROGRAM

## 2022-10-17 ASSESSMENT — PAIN SCALES - GENERAL: PAINLEVEL: NO PAIN (0)

## 2022-10-17 NOTE — PROGRESS NOTES
[facility] Dermatology Note    Encounter Date: Oct 17, 2022    Dermatology Problem List:  -   ______________________________________    Impression/Plan:  Tip was seen today for skin check.    Diagnoses and all orders for this visit:    Multiple benign nevi  - benign    Actinic skin damage  - discussed sunprotective behaviors, clothing, and the use of sunscreen    Dermatofibroma  - benign      Follow-up in 1 year.       Staff Involved:  Staff Only    Renard Rodriguez MD   of Dermatology  Department of Dermatology  [facility]      CC:   Chief Complaint   Patient presents with     Skin Check       History of Present Illness:  Mr. Tip Colin is a 47 year old male who presents as a nrew patient.    Brown spots on back, firm spot L arm     Labs:      Physical exam:  Vitals: There were no vitals taken for this visit.  GEN: well developed, well-nourished, in no acute distress, in a pleasant mood.     SKIN: Kirby phototype 2  - Full skin, which includes the head/face, both arms, chest, back, abdomen,both legs, genitalia and/or groin buttocks, digits and/or nails, was examined.  - Flat brown macules and patches in a sun exposed areas on face and extremities  - scattered brown papules on trunk and extremities   - firm papule that dimples w/ pressure R arm   - No other lesions of concern on areas examined.     Past Medical History:   Past Medical History:   Diagnosis Date     Acute gouty arthritis      Past Surgical History:   Procedure Laterality Date     DENTAL SURGERY       KNEE SURGERY Left     bone spur removed       Social History:   reports that he has never smoked. He has never used smokeless tobacco. He reports current alcohol use. He reports that he does not use drugs.    Family History:  Family History   Problem Relation Age of Onset     Uterine Cancer Mother      Breast Cancer Sister 53     Melanoma Sister      Retinal detachment Brother       Cerebrovascular Disease Paternal Grandfather      Glaucoma No family hx of      Macular Degeneration No family hx of        Medications:  Current Outpatient Medications   Medication Sig Dispense Refill     Multiple Vitamins-Minerals (CENTRUM ADULTS PO) Take 1 tablet by mouth daily       Vitamin D (Cholecalciferol) 25 MCG (1000 UT) CAPS Take 1 capsule by mouth daily       Dextromethorphan HBr (VICKS DAYQUIL COUGH) 15 MG/15ML LIQD Take by mouth as needed (Patient not taking: Reported on 10/17/2022)       No Known Allergies

## 2022-10-17 NOTE — PATIENT INSTRUCTIONS
"The spot on your arm is consistent with a Dermatofibroma      SUNSCREENS  UVA and UVB PROTECTION    Sunlight consists of two types of light that can cause or worsen many skin problems:   - UVA (ultraviolet A): Less variation with seasons  All year round  All day strong  Passes through glass and clouds --this is important to remember while you drive   -  UVB (ultraviolet B):  Strongest in summer  Peak hours 10 am to 4pm  SPF rates UVB protection, SPF 30 blocks 97% of UVB rays (if applied correctly).     Things that can be caused or worsened by UV light, either by the sun or by tanning beds:  Skin cancers  Sunburns   Increased number of moles, brown spots, age spots  Wrinkles and other types of aging, thinning/weakening/increasing fragility of the skin, easier bruising, \"weather-beaten look\"  Rosacea  Certain autoimmune conditions     Sunscreens that block both UVA and UVB light contain these ingredients:  Zinc Oxide (should contain at least 4% zinc oxide)  Titanium Dioxide  Parsol or Avobenzone (additives improve stability of Avobenzone)  There are other UVA blocking ingredients but they are not as complete as these three.     Tips/Suggestions:   SPF of 30 or higher, but some literature suggests that even higher numbers might provide even better protection  Zinc Oxide or other UVA block such as Helioplex or Anthelios in product  Remember there is no safe UV light so there is no such thing as a safe suntan.     Apply sunscreen daily (even in winter and on cloudy days) and reapply depending on activity--every 1.5 to 2 hours if out in sun for a long time.  Apply sunscreen at least 15 to 30 minutes before sun exposure  Approximately one ounce (a shot glass) is necessary to adequately cover the entire body.  Seek shade when possible      Examples of sunscreens:  - Elta MD (you can order it online or buy it at [employer]. It has microsized Zinc oxide and/or titanium dioxide)  - " "Neutrogena sun block lotion for sensitive skin with SPF 30  - Oil of Olay complete defense daily UV moisturizer with SPF 30  -Cetaphil SPF 30 for normal skin or SPF 50 for dry skin  Sunblocks that only have \"physical\" ingredients (zinc oxide, titanium dioxide), no \"chemical\" sunscreen ingredients:  CeraVe Mineral   LA ROCHE-POSAY Mineral  Neutrogena Sheer Zinc Dry-Touch  Vanicream Broad Spectrum 50+  Goddess Garden  Sunscreens that are light-weight and often desirable for people who are very active or sweat a lot:   Neutrogena Sport  Neutrogena Hydroboost       Patient Education     Checking for Skin Cancer  You can find cancer early by checking your skin each month. There are 3 kinds of skin cancer. They are melanoma, basal cell carcinoma, and squamous cell carcinoma. Doing monthly skin checks is the best way to find new marks or skin changes. Follow the instructions below for checking your skin.   The ABCDEs of checking moles for melanoma   Check your moles or growths for signs of melanoma using ABCDE:   Asymmetry: the sides of the mole or growth don t match  Border: the edges are ragged, notched, or blurred  Color: the color within the mole or growth varies  Diameter: the mole or growth is larger than 6 mm (size of a pencil eraser)  Evolving: the size, shape, or color of the mole or growth is changing (evolving is not shown in the images below)    Checking for other types of skin cancer  Basal cell carcinoma or squamous cell carcinoma have symptoms such as:     A spot or mole that looks different from all other marks on your skin  Changes in how an area feels, such as itching, tenderness, or pain  Changes in the skin's surface, such as oozing, bleeding, or scaliness  A sore that does not heal  New swelling or redness beyond the border of a mole    Who s at risk?  Anyone can get skin cancer. But you are at greater risk if you have:   Fair skin, light-colored hair, or light-colored eyes  Many moles or " abnormal moles on your skin  A history of sunburns from sunlight or tanning beds  A family history of skin cancer  A history of exposure to radiation or chemicals  A weakened immune system  If you have had skin cancer in the past, you are at risk for recurring skin cancer.   How to check your skin  Do your monthly skin checkups in front of a full-length mirror. Check all parts of your body, including your:   Head (ears, face, neck, and scalp)  Torso (front, back, and sides)  Arms (tops, undersides, upper, and lower armpits)  Hands (palms, backs, and fingers, including under the nails)  Buttocks and genitals  Legs (front, back, and sides)  Feet (tops, soles, toes, including under the nails, and between toes)  If you have a lot of moles, take digital photos of them each month. Make sure to take photos both up close and from a distance. These can help you see if any moles change over time.   Most skin changes are not cancer. But if you see any changes in your skin, call your doctor right away. Only he or she can diagnose a problem. If you have skin cancer, seeing your doctor can be the first step toward getting the treatment that could save your life.   [facility] last reviewed this educational content on 4/1/2019 2000-2020 [employer]. [address] [place] [address]. All rights reserved. This information is not intended as a substitute for professional medical care. Always follow your healthcare professional's instructions.       When should I call my doctor?  If you are worsening or not improving, please, contact us or seek urgent care as noted below.     Who should I call with questions (adults)?  [facility] (adult and pediatric): [phone]  [facility] (adult): [phone]  For urgent needs outside of business hours call the [facility] at [phone] and ask for the dermatology resident on call to be paged  If this  is a medical emergency and you are unable to reach an ER, Call 911    Who should I call with questions (pediatric)?  [facility]- Pediatric Dermatology  Dr. Joi Can, Dr. Maryann Pollock, Dr. Marta Mccullough, JORDI Romo, Dr. Patti Mata, Dr. Lakeshia Robles & Dr. Tip Ames  Non-urgent nurse triage line; [phone]- Isa and Lilian RN Care Coordinators   Janelle (/Complex ) [phone]    If you need a prescription refill, please contact your pharmacy. Refills are approved or denied by our Physicians during normal business hours, Monday through Fridays  Per office policy, refills will not be granted if you have not been seen within the past year (or sooner depending on your child's condition)    Scheduling Information:  Pediatric Appointment Scheduling and Call Center [phone]  Radiology Scheduling- [phone]  Sedation Unit Scheduling- [phone]  [place] Scheduling- General [phone]; Pediatric Dermatology [phone]  Main  Services: [phone]  Slovenian: [phone]  Marshallese: [phone]  Hmong/Danish/Greenlandic: [phone]  Preadmission Nursing Department Fax Number: [phone] (Fax all pre-operative paperwork to this number)    For urgent matters arising during evenings, weekends, or holidays that cannot wait for normal business hours please call [phone] and ask for the dermatology resident on call to be paged.

## 2022-10-17 NOTE — LETTER
10/17/2022         RE: Tip Colin  [address] [place] [address]        Dear Colleague,    Thank you for referring your patient, Tip Colin, to the [facility]. Please see a copy of my visit note below.    [facility] Dermatology Note    Encounter Date: Oct 17, 2022    Dermatology Problem List:  -   ______________________________________    Impression/Plan:  Tip was seen today for skin check.    Diagnoses and all orders for this visit:    Multiple benign nevi  - benign    Actinic skin damage  - discussed sunprotective behaviors, clothing, and the use of sunscreen    Dermatofibroma  - benign      Follow-up in 1 year.       Staff Involved:  Staff Only    Renard Rodriguez MD   of Dermatology  Department of Dermatology  [facility]      CC:   Chief Complaint   Patient presents with     Skin Check       History of Present Illness:  Mr. Tip Colin is a 47 year old male who presents as a nrew patient.    Brown spots on back, firm spot L arm     Labs:      Physical exam:  Vitals: There were no vitals taken for this visit.  GEN: well developed, well-nourished, in no acute distress, in a pleasant mood.     SKIN: Kirby phototype 2  - Full skin, which includes the head/face, both arms, chest, back, abdomen,both legs, genitalia and/or groin buttocks, digits and/or nails, was examined.  - Flat brown macules and patches in a sun exposed areas on face and extremities  - scattered brown papules on trunk and extremities   - firm papule that dimples w/ pressure R arm   - No other lesions of concern on areas examined.     Past Medical History:   Past Medical History:   Diagnosis Date     Acute gouty arthritis      Past Surgical History:   Procedure Laterality Date     DENTAL SURGERY       KNEE SURGERY Left     bone spur removed       Social History:   reports that he has never smoked. He has never used  smokeless tobacco. He reports current alcohol use. He reports that he does not use drugs.    Family History:  Family History   Problem Relation Age of Onset     Uterine Cancer Mother      Breast Cancer Sister 53     Melanoma Sister      Retinal detachment Brother      Cerebrovascular Disease Paternal Grandfather      Glaucoma No family hx of      Macular Degeneration No family hx of        Medications:  Current Outpatient Medications   Medication Sig Dispense Refill     Multiple Vitamins-Minerals (CENTRUM ADULTS PO) Take 1 tablet by mouth daily       Vitamin D (Cholecalciferol) 25 MCG (1000 UT) CAPS Take 1 capsule by mouth daily       Dextromethorphan HBr (VICKS DAYQUIL COUGH) 15 MG/15ML LIQD Take by mouth as needed (Patient not taking: Reported on 10/17/2022)       No Known Allergies                Again, thank you for allowing me to participate in the care of your patient.        Sincerely,        eRnard Rodriguez MD

## 2022-10-24 LAB
CULTURE HARVEST COMPLETE DATE: NORMAL
CULTURE HARVEST COMPLETE DATE: NORMAL
INTERPRETATION: NORMAL
ISCN: NORMAL
METHODS: NORMAL

## 2022-12-12 ENCOUNTER — OFFICE VISIT (OUTPATIENT)
Dept: FAMILY MEDICINE | Facility: CLINIC | Age: 47
End: 2022-12-12
Payer: COMMERCIAL

## 2022-12-12 VITALS
BODY MASS INDEX: 23.85 KG/M2 | HEIGHT: 72 IN | OXYGEN SATURATION: 95 % | HEART RATE: 74 BPM | DIASTOLIC BLOOD PRESSURE: 71 MMHG | WEIGHT: 176.08 LBS | SYSTOLIC BLOOD PRESSURE: 126 MMHG | TEMPERATURE: 97.7 F

## 2022-12-12 DIAGNOSIS — D47.2 MGUS (MONOCLONAL GAMMOPATHY OF UNKNOWN SIGNIFICANCE): ICD-10-CM

## 2022-12-12 DIAGNOSIS — M25.561 ACUTE PAIN OF RIGHT KNEE: ICD-10-CM

## 2022-12-12 DIAGNOSIS — K11.20 SIALADENITIS: Primary | ICD-10-CM

## 2022-12-12 NOTE — NURSING NOTE
"47 year old  Chief Complaint   Patient presents with     RECHECK     Swollen lymph node on the left side of neck. Has been there for a few months on off       Blood pressure 126/71, pulse 74, temperature 97.7  F (36.5  C), height 1.829 m (6' 0.01\"), weight 79.9 kg (176 lb 1.3 oz), SpO2 95 %. Body mass index is 23.88 kg/m .  Patient Active Problem List   Diagnosis     Nasal congestion     Plantar fasciitis     Acute gouty arthritis     MGUS (monoclonal gammopathy of unknown significance)       Wt Readings from Last 2 Encounters:   12/12/22 79.9 kg (176 lb 1.3 oz)   09/23/22 81.7 kg (180 lb 3.2 oz)     BP Readings from Last 3 Encounters:   12/12/22 126/71   09/23/22 103/56   05/18/22 123/73         Current Outpatient Medications   Medication     Multiple Vitamins-Minerals (CENTRUM ADULTS PO)     Vitamin D (Cholecalciferol) 25 MCG (1000 UT) CAPS     Dextromethorphan HBr (VICKS DAYQUIL COUGH) 15 MG/15ML LIQD     No current facility-administered medications for this visit.       Social History     Tobacco Use     Smoking status: Never     Smokeless tobacco: Never   Vaping Use     Vaping Use: Never used   Substance Use Topics     Alcohol use: Yes     Comment: maybe three drinks a week     Drug use: No       Health Maintenance Due   Topic Date Due     ADVANCE CARE PLANNING  Never done     HEPATITIS B IMMUNIZATION (1 of 3 - 3-dose series) Never done     HEPATITIS C SCREENING  Never done     COVID-19 Vaccine (3 - Booster for Pfizer series) 05/18/2022     INFLUENZA VACCINE (1) 09/01/2022       No results found for: PAP      December 12, 2022 3:36 PM  "

## 2022-12-12 NOTE — PROGRESS NOTES
"Tip Colin is a 47 year old male with hx of MGUS, gout, plantar fasciitis. He is here for the following issues:    MGUS  Tip is a 48 yo male with hx recent diagnosis of MGUS. This was found incidentally after he donated plasma. He was contacted by the blood center and told to seek further evaluation. He has been evaluated by a hematologist/oncologist. Thus far he has had normal calcium, hemoglobin, skeletal survey. He recently had a bone marrow biopsy showing normocelluar marrow with 5-10% plasma cells. And polyclonal 10-20% B cells. Flow cytometry showed mainly polytypic plasma cells. No evidence for B cell lymphoma. He remains well, no fever, fatigue, sweats. He has been eating healthier and had lost 8 pounds.    Left sided neck mass  Tip presents with concern for left sided neck mass, ongoing for several months. He has concern for enlarged LN. Denies night sweats, fever or fatigue. He reports some swelling at the left anterior neck which comes and goes. He also identifies that it enlarges after eating and has an \"irritated\" sensation, \"tightness\" under his tongue when this occurs. Denies fever, sore throat or URI symptoms.     MGUS  Tip is a 48 yo male with hx recent diagnosis of MGUS. This was found incidentally after he donated plasma. He was contacted by the blood center and told to seek further evaluation. He has been evaluated by a hematologist/oncologist. Thus far he has had normal calcium, hemoglobin, skeletal survey. He recently had a bone marrow biopsy showing normocelluar marrow with 5-10% plasma cells. And polyclonal 10-20% B cells. Flow cytometry showed mainly polytypic plasma cells. No evidence for B cell lymphoma. He remains well, no fever, fatigue, sweats. He has been eating healthier and had lost 8 pounds.      Right knee pain  Tip reports that he had a \"bone spur\" removed from his left knee while in high school. He describes that his left knee was stuck in flexion and it was painful. " "After surgery, symptoms improved. Now he notes similar symptoms on the right knee. It happened just this week. He knelt down, then felt as if his knee \"locked\". He had some difficulty straightening his right leg upon standing.     Upset stomach  Earlier this year, Tip noted abdominal discomfort which awoke him from sleep. This occurred after eating a large quantity of food. On occasion he vomited. He reports he changed his eating habits with good resolution of symptoms. He denies dysphagia. Reports an occasional \"catch\" on the left side of his abdomen.     Patient Active Problem List   Diagnosis     Nasal congestion     Plantar fasciitis     Acute gouty arthritis     MGUS (monoclonal gammopathy of unknown significance)       Current Outpatient Medications   Medication Sig Dispense Refill     Dextromethorphan HBr (VICKS DAYQUIL COUGH) 15 MG/15ML LIQD Take by mouth as needed (Patient not taking: Reported on 10/17/2022)       Multiple Vitamins-Minerals (CENTRUM ADULTS PO) Take 1 tablet by mouth daily       Vitamin D (Cholecalciferol) 25 MCG (1000 UT) CAPS Take 1 capsule by mouth daily         No Known Allergies     EXAM  /71   Pulse 74   Temp 97.7  F (36.5  C)   Ht 1.829 m (6' 0.01\")   Wt 79.9 kg (176 lb 1.3 oz)   SpO2 95%   BMI 23.88 kg/m    Gen: Alert, pleasant, NAD  HEENT:  OP is clear, base of tongue is normal, no plugged salivary glands, no stones.   No facial asymmetry, no redness or warmth  Neck: no mass upon inspection. Palpation finds symmetric submandibular glands and lymph nodes of normal size.  COR: S1,S2, no murmur  Lungs: CTA bilaterally, no rhonchi, wheezes or rales  Abdomen: Soft, non tender, normal bowel sounds, no HSM or mass  Axillae: no LAD      Assessment:  (K11.20) Sialadenitis  (primary encounter diagnosis)  Comment: suspect waxing waning neck mass is irritated salivary gland, no current cause for alarm, no infection suspected, no current abnormal masses  Plan: discussed sign's " symptoms of episodic fullness in neck.  Emphasized conservative treatment/ prevention with plenty of fluids, massage, warm moist heat. Notify MD if symptoms recur, are persistent. Consider referral to ENT if this occurs    (M25.561) Acute pain of right knee  Comment: at time, Tip reports right knee locks, stuck in flexion  Plan: Orthopedic  Referral        Refer to sports medicine for further evaluation, treatment    (D47.2) MGUS (monoclonal gammopathy of unknown significance)  Comment: incidental finding when donating plasma. He has been seen by a hematologist. Has no sign or symptoms of multiple myeloma  Plan: discussed that risk of transformation of patients with MM is about 1% per year.  I sent following information handout to him to better understand this condition    [email or web address]    38 minutes spend on the date of this encounter doing chart review, history and exam, documentation and further activities as noted above.       Fatuma Houston MD  Internal Medicine/Pediatrics

## 2022-12-30 DIAGNOSIS — D47.2 MGUS (MONOCLONAL GAMMOPATHY OF UNKNOWN SIGNIFICANCE): Primary | ICD-10-CM

## 2022-12-31 PROBLEM — M25.561 ACUTE PAIN OF RIGHT KNEE: Status: ACTIVE | Noted: 2022-12-31

## 2022-12-31 PROBLEM — K11.20 SIALADENITIS: Status: ACTIVE | Noted: 2022-12-31

## 2023-01-02 ENCOUNTER — LAB (OUTPATIENT)
Dept: INFUSION THERAPY | Facility: HOSPITAL | Age: 48
End: 2023-01-02
Attending: INTERNAL MEDICINE
Payer: COMMERCIAL

## 2023-01-02 DIAGNOSIS — D47.2 MGUS (MONOCLONAL GAMMOPATHY OF UNKNOWN SIGNIFICANCE): ICD-10-CM

## 2023-01-02 LAB
ALBUMIN SERPL BCG-MCNC: 4.2 G/DL (ref 3.5–5.2)
ALP SERPL-CCNC: 73 U/L (ref 40–129)
ALT SERPL W P-5'-P-CCNC: 21 U/L (ref 10–50)
ANION GAP SERPL CALCULATED.3IONS-SCNC: 6 MMOL/L (ref 7–15)
AST SERPL W P-5'-P-CCNC: 19 U/L (ref 10–50)
BASOPHILS # BLD AUTO: 0 10E3/UL (ref 0–0.2)
BASOPHILS NFR BLD AUTO: 1 %
BILIRUB SERPL-MCNC: 0.7 MG/DL
BUN SERPL-MCNC: 10 MG/DL (ref 6–20)
CALCIUM SERPL-MCNC: 9.4 MG/DL (ref 8.6–10)
CHLORIDE SERPL-SCNC: 101 MMOL/L (ref 98–107)
CREAT SERPL-MCNC: 0.85 MG/DL (ref 0.67–1.17)
DEPRECATED HCO3 PLAS-SCNC: 29 MMOL/L (ref 22–29)
EOSINOPHIL # BLD AUTO: 0.1 10E3/UL (ref 0–0.7)
EOSINOPHIL NFR BLD AUTO: 2 %
ERYTHROCYTE [DISTWIDTH] IN BLOOD BY AUTOMATED COUNT: 12.1 % (ref 10–15)
GFR SERPL CREATININE-BSD FRML MDRD: >90 ML/MIN/1.73M2
GLUCOSE SERPL-MCNC: 106 MG/DL (ref 70–99)
HCT VFR BLD AUTO: 43 % (ref 40–53)
HGB BLD-MCNC: 15.2 G/DL (ref 13.3–17.7)
HOLD SPECIMEN: NORMAL
IMM GRANULOCYTES # BLD: 0 10E3/UL
IMM GRANULOCYTES NFR BLD: 0 %
LYMPHOCYTES # BLD AUTO: 1.5 10E3/UL (ref 0.8–5.3)
LYMPHOCYTES NFR BLD AUTO: 23 %
MCH RBC QN AUTO: 32.2 PG (ref 26.5–33)
MCHC RBC AUTO-ENTMCNC: 35.3 G/DL (ref 31.5–36.5)
MCV RBC AUTO: 91 FL (ref 78–100)
MONOCYTES # BLD AUTO: 0.5 10E3/UL (ref 0–1.3)
MONOCYTES NFR BLD AUTO: 8 %
NEUTROPHILS # BLD AUTO: 4.5 10E3/UL (ref 1.6–8.3)
NEUTROPHILS NFR BLD AUTO: 66 %
NRBC # BLD AUTO: 0 10E3/UL
NRBC BLD AUTO-RTO: 0 /100
PLATELET # BLD AUTO: 222 10E3/UL (ref 150–450)
POTASSIUM SERPL-SCNC: 4.1 MMOL/L (ref 3.4–5.3)
PROT SERPL-MCNC: 6.9 G/DL (ref 6.4–8.3)
RBC # BLD AUTO: 4.72 10E6/UL (ref 4.4–5.9)
SODIUM SERPL-SCNC: 136 MMOL/L (ref 136–145)
TOTAL PROTEIN SERUM FOR ELP: 6.6 G/DL (ref 6.4–8.3)
WBC # BLD AUTO: 6.7 10E3/UL (ref 4–11)

## 2023-01-02 PROCEDURE — 85025 COMPLETE CBC W/AUTO DIFF WBC: CPT

## 2023-01-02 PROCEDURE — 86334 IMMUNOFIX E-PHORESIS SERUM: CPT | Performed by: PATHOLOGY

## 2023-01-02 PROCEDURE — 36415 COLL VENOUS BLD VENIPUNCTURE: CPT

## 2023-01-02 PROCEDURE — 84165 PROTEIN E-PHORESIS SERUM: CPT | Mod: TC | Performed by: PATHOLOGY

## 2023-01-02 PROCEDURE — 84155 ASSAY OF PROTEIN SERUM: CPT

## 2023-01-02 PROCEDURE — 80053 COMPREHEN METABOLIC PANEL: CPT

## 2023-01-02 PROCEDURE — 83521 IG LIGHT CHAINS FREE EACH: CPT | Mod: 59

## 2023-01-02 PROCEDURE — 86334 IMMUNOFIX E-PHORESIS SERUM: CPT | Mod: 26

## 2023-01-02 PROCEDURE — 84165 PROTEIN E-PHORESIS SERUM: CPT | Mod: 26

## 2023-01-02 NOTE — ADDENDUM NOTE
Addended by: KATIE FAUSTIN on: 1/2/2023 09:06 AM     Modules accepted: Orders     Quit smoking cigarettes 30 years ago. Denies alcohol and drugs.

## 2023-01-03 LAB
ALBUMIN SERPL ELPH-MCNC: 4.2 G/DL (ref 3.7–5.1)
ALPHA1 GLOB SERPL ELPH-MCNC: 0.2 G/DL (ref 0.2–0.4)
ALPHA2 GLOB SERPL ELPH-MCNC: 0.5 G/DL (ref 0.5–0.9)
B-GLOBULIN SERPL ELPH-MCNC: 0.6 G/DL (ref 0.6–1)
GAMMA GLOB SERPL ELPH-MCNC: 1.1 G/DL (ref 0.7–1.6)
KAPPA LC FREE SER-MCNC: 2.03 MG/DL (ref 0.33–1.94)
KAPPA LC FREE/LAMBDA FREE SER NEPH: 1.88 {RATIO} (ref 0.26–1.65)
LAMBDA LC FREE SERPL-MCNC: 1.08 MG/DL (ref 0.57–2.63)
M PROTEIN SERPL ELPH-MCNC: 0.4 G/DL
PROT PATTERN SERPL ELPH-IMP: ABNORMAL
PROT PATTERN SERPL IFE-IMP: NORMAL

## 2023-01-11 ENCOUNTER — TELEPHONE (OUTPATIENT)
Dept: ONCOLOGY | Facility: HOSPITAL | Age: 48
End: 2023-01-11

## 2023-01-11 ENCOUNTER — VIRTUAL VISIT (OUTPATIENT)
Dept: ONCOLOGY | Facility: HOSPITAL | Age: 48
End: 2023-01-11
Attending: INTERNAL MEDICINE
Payer: COMMERCIAL

## 2023-01-11 DIAGNOSIS — D47.2 MGUS (MONOCLONAL GAMMOPATHY OF UNKNOWN SIGNIFICANCE): Primary | ICD-10-CM

## 2023-01-11 DIAGNOSIS — J35.1 TONSILLAR ENLARGEMENT: ICD-10-CM

## 2023-01-11 PROCEDURE — 99213 OFFICE O/P EST LOW 20 MIN: CPT | Mod: VID | Performed by: INTERNAL MEDICINE

## 2023-01-11 PROCEDURE — G0463 HOSPITAL OUTPT CLINIC VISIT: HCPCS | Mod: PN,GT | Performed by: INTERNAL MEDICINE

## 2023-01-11 NOTE — PROGRESS NOTES
Tip is a 47 year old who is being evaluated via a billable video visit.      How would you like to obtain your AVS? [employer]  If the video visit is dropped, the invitation should be resent by: Text to cell phone: [phone]  Will anyone else be joining your video visit? No      Video-Visit Details    Type of service:  Video Visit   Video Start Time:   Video End Time:    Originating Location (pt. Location): Home    Distant Location (provider location):  On-site  Platform used for Video Visit: Kaitlynn Mason RN

## 2023-02-10 NOTE — PROGRESS NOTES
Video-Visit Details    Type of service:  Video Visit    Video Start Time: 2:17 PM  Video End Time: 2:28PM    Originating Location (pt. Location): Home in [place]    Distant Location (provider location):  [facility]/[place]    Platform used for Video Visit: [facility]emat[facility] Progress Note    Patient: Tip Colin  MRN: [id number]  Date of Service: 05/27/2022        Reason for Visit    Chief Complaint   Patient presents with     Oncology Clinic Visit         Problem List Items Addressed This Visit        Immune    MGUS (monoclonal gammopathy of unknown significance) - Primary    Relevant Orders    Adult ENT  Referral   Other Visit Diagnoses     Tonsillar enlargement        Relevant Orders    Adult ENT  Referral            Assessment and Plan  1. IgM kappa MGUS  Previously his bone marrow biopsy showed 5 to 10% plasma cells with slightly skewed kappa to lambda light chain expression ratio on the flow.  Polytypic plasma cells also had partial CD20 expression.  No convincing evidence of multiple myeloma.  No evidence of lymphoma.  We decided to pursue observation only.    I reviewed his labs from last week.  Monoclonal protein is stable at 0.4 g deciliter.  Kappa light chains are mildly up at 2.03 laminations are normal and kappa ratio is mildly up at 1.88.  CBC is normal.  Calcium is normal.  Creatinine is normal.  Clinically no evidence of any disease progression.  Recommendation will be to continue to monitor with repeat labs in 3 months.    He does complain of on and off URI symptoms and ?neck adenopathy, ? chronic tonsillitis.  They wanted ENT consultation.  Order placed.     Cancer Staging   No matching staging information was found for the patient.    ECOG Performance    0 - Independent         Problem List    Patient Active Problem List   Diagnosis     Nasal congestion     Plantar fasciitis     Acute gouty arthritis     MGUS (monoclonal  gammopathy of unknown significance)     Acute pain of right knee     Sialadenitis        Interval History   Tip Colin is a 46 year old with recent diagnosis of IgM kappa MGUS who is seen as a video visit for follow-up.    Doing relatively well since last visit.  Denies any fevers or chills.  Continues to have mild occasional night sweats.  Has had some URI symptoms in the form of throat irritation and possible submandibular/cervical lymphadenopathy.  Denies any weight loss.  Appetite is good.    Labs reviewed today.    Review of Systems  A comprehensive review of systems was negative except for what is noted in the interval history    Current Outpatient Medications   Medication     Dextromethorphan HBr (VICKS DAYQUIL COUGH) 15 MG/15ML LIQD     Multiple Vitamins-Minerals (CENTRUM ADULTS PO)     Vitamin D (Cholecalciferol) 25 MCG (1000 UT) CAPS     No current facility-administered medications for this visit.        Physical Exam    No flowsheet data found.    General: alert and cooperative   CNS: Alert and oriented x3, neurologic exam grossly normal.    Lab Results    No results found for this or any previous visit (from the past 168 hour(s)).    Imaging    No results found.     A total of 25 min were spent today on this visit which included face to face conversation with the patient, EMR review, counseling and co-ordination of care and medical documentation.      Signed by: Gerry Escobar MD

## 2023-03-08 ENCOUNTER — TELEPHONE (OUTPATIENT)
Dept: INFUSION THERAPY | Facility: HOSPITAL | Age: 48
End: 2023-03-08
Payer: COMMERCIAL

## 2023-03-28 DIAGNOSIS — R79.9 ABNORMAL BLOOD CELL COUNT: ICD-10-CM

## 2023-03-28 DIAGNOSIS — D47.2 MGUS (MONOCLONAL GAMMOPATHY OF UNKNOWN SIGNIFICANCE): Primary | ICD-10-CM

## 2023-03-28 DIAGNOSIS — R77.8 ABNORMAL SPEP: ICD-10-CM

## 2023-04-03 ENCOUNTER — LAB (OUTPATIENT)
Dept: INFUSION THERAPY | Facility: HOSPITAL | Age: 48
End: 2023-04-03
Attending: INTERNAL MEDICINE
Payer: COMMERCIAL

## 2023-04-03 DIAGNOSIS — R79.9 ABNORMAL BLOOD CELL COUNT: ICD-10-CM

## 2023-04-03 DIAGNOSIS — R77.8 ABNORMAL SPEP: ICD-10-CM

## 2023-04-03 DIAGNOSIS — D47.2 MGUS (MONOCLONAL GAMMOPATHY OF UNKNOWN SIGNIFICANCE): ICD-10-CM

## 2023-04-03 LAB
ALBUMIN SERPL BCG-MCNC: 4.3 G/DL (ref 3.5–5.2)
ALP SERPL-CCNC: 75 U/L (ref 40–129)
ALT SERPL W P-5'-P-CCNC: 17 U/L (ref 10–50)
ANION GAP SERPL CALCULATED.3IONS-SCNC: 7 MMOL/L (ref 7–15)
AST SERPL W P-5'-P-CCNC: 20 U/L (ref 10–50)
BASOPHILS # BLD AUTO: 0 10E3/UL (ref 0–0.2)
BASOPHILS NFR BLD AUTO: 1 %
BILIRUB SERPL-MCNC: 0.7 MG/DL
BUN SERPL-MCNC: 18.1 MG/DL (ref 6–20)
CALCIUM SERPL-MCNC: 9 MG/DL (ref 8.6–10)
CHLORIDE SERPL-SCNC: 106 MMOL/L (ref 98–107)
CREAT SERPL-MCNC: 0.94 MG/DL (ref 0.67–1.17)
DEPRECATED HCO3 PLAS-SCNC: 30 MMOL/L (ref 22–29)
EOSINOPHIL # BLD AUTO: 0.1 10E3/UL (ref 0–0.7)
EOSINOPHIL NFR BLD AUTO: 2 %
ERYTHROCYTE [DISTWIDTH] IN BLOOD BY AUTOMATED COUNT: 11.8 % (ref 10–15)
GFR SERPL CREATININE-BSD FRML MDRD: >90 ML/MIN/1.73M2
GLUCOSE SERPL-MCNC: 88 MG/DL (ref 70–99)
HCT VFR BLD AUTO: 42.3 % (ref 40–53)
HGB BLD-MCNC: 14.6 G/DL (ref 13.3–17.7)
IMM GRANULOCYTES # BLD: 0 10E3/UL
IMM GRANULOCYTES NFR BLD: 0 %
LYMPHOCYTES # BLD AUTO: 1.4 10E3/UL (ref 0.8–5.3)
LYMPHOCYTES NFR BLD AUTO: 23 %
MCH RBC QN AUTO: 31.9 PG (ref 26.5–33)
MCHC RBC AUTO-ENTMCNC: 34.5 G/DL (ref 31.5–36.5)
MCV RBC AUTO: 92 FL (ref 78–100)
MONOCYTES # BLD AUTO: 0.6 10E3/UL (ref 0–1.3)
MONOCYTES NFR BLD AUTO: 9 %
NEUTROPHILS # BLD AUTO: 4.2 10E3/UL (ref 1.6–8.3)
NEUTROPHILS NFR BLD AUTO: 65 %
NRBC # BLD AUTO: 0 10E3/UL
NRBC BLD AUTO-RTO: 0 /100
PLATELET # BLD AUTO: 218 10E3/UL (ref 150–450)
POTASSIUM SERPL-SCNC: 4 MMOL/L (ref 3.4–5.3)
PROT SERPL-MCNC: 7.2 G/DL (ref 6.4–8.3)
RBC # BLD AUTO: 4.58 10E6/UL (ref 4.4–5.9)
SODIUM SERPL-SCNC: 143 MMOL/L (ref 136–145)
TOTAL PROTEIN SERUM FOR ELP: 6.8 G/DL (ref 6.4–8.3)
WBC # BLD AUTO: 6.3 10E3/UL (ref 4–11)

## 2023-04-03 PROCEDURE — 84155 ASSAY OF PROTEIN SERUM: CPT | Mod: 91

## 2023-04-03 PROCEDURE — 85025 COMPLETE CBC W/AUTO DIFF WBC: CPT

## 2023-04-03 PROCEDURE — 86334 IMMUNOFIX E-PHORESIS SERUM: CPT | Performed by: PATHOLOGY

## 2023-04-03 PROCEDURE — 83521 IG LIGHT CHAINS FREE EACH: CPT

## 2023-04-03 PROCEDURE — 36415 COLL VENOUS BLD VENIPUNCTURE: CPT

## 2023-04-03 PROCEDURE — 80053 COMPREHEN METABOLIC PANEL: CPT

## 2023-04-03 PROCEDURE — 84165 PROTEIN E-PHORESIS SERUM: CPT | Mod: TC | Performed by: PATHOLOGY

## 2023-04-04 LAB
KAPPA LC FREE SER-MCNC: 1.84 MG/DL (ref 0.33–1.94)
KAPPA LC FREE/LAMBDA FREE SER NEPH: 1.46 {RATIO} (ref 0.26–1.65)
LAMBDA LC FREE SERPL-MCNC: 1.26 MG/DL (ref 0.57–2.63)

## 2023-04-05 LAB
ALBUMIN SERPL ELPH-MCNC: 4.4 G/DL (ref 3.7–5.1)
ALPHA1 GLOB SERPL ELPH-MCNC: 0.2 G/DL (ref 0.2–0.4)
ALPHA2 GLOB SERPL ELPH-MCNC: 0.4 G/DL (ref 0.5–0.9)
B-GLOBULIN SERPL ELPH-MCNC: 0.6 G/DL (ref 0.6–1)
GAMMA GLOB SERPL ELPH-MCNC: 1.1 G/DL (ref 0.7–1.6)
M PROTEIN SERPL ELPH-MCNC: 0.4 G/DL
PROT PATTERN SERPL ELPH-IMP: ABNORMAL
PROT PATTERN SERPL IFE-IMP: NORMAL

## 2023-04-05 PROCEDURE — 84165 PROTEIN E-PHORESIS SERUM: CPT | Mod: 26

## 2023-04-05 PROCEDURE — 86334 IMMUNOFIX E-PHORESIS SERUM: CPT | Mod: 26

## 2023-04-11 ENCOUNTER — VIRTUAL VISIT (OUTPATIENT)
Dept: ONCOLOGY | Facility: CLINIC | Age: 48
End: 2023-04-11
Attending: INTERNAL MEDICINE
Payer: COMMERCIAL

## 2023-04-11 DIAGNOSIS — D47.2 MGUS (MONOCLONAL GAMMOPATHY OF UNKNOWN SIGNIFICANCE): Primary | ICD-10-CM

## 2023-04-11 PROCEDURE — 99213 OFFICE O/P EST LOW 20 MIN: CPT | Mod: VID | Performed by: INTERNAL MEDICINE

## 2023-04-11 NOTE — LETTER
4/11/2023         RE: Tip Colin  [address] [place] [address]        Dear Colleague,    Thank you for referring your patient, Tip Colin, to the [facility] [place]. Please see a copy of my visit note below.    Virtual Visit Details    Type of service:  Video Visit   Video Start Time: 8:25 AM  Video End Time:8:33AM    Originating Location (pt. Location): Home    Distant Location (provider location):  Off-site  Platform used for Video Visit: [facility] Hematology and Oncology Progress Note    Patient: Tip Colin  MRN: [id number]  Date of Service: 05/27/2022        Reason for Visit    Chief Complaint   Patient presents with     Video Visit         Problem List Items Addressed This Visit        Immune    MGUS (monoclonal gammopathy of unknown significance) - Primary         Assessment and Plan  1. IgM kappa MGUS  Previously his bone marrow biopsy showed 5 to 10% plasma cells with slightly skewed kappa to lambda light chain expression ratio on the flow.  Polytypic plasma cells which also had partial CD20 expression.  No convincing evidence of multiple myeloma.  No evidence of lymphoma.  We decided to pursue observation only.    Clinically he is asymptomatic.  Labs from last week showed stable monoclonal protein at 0.4 g/dL.  Kappa lambda light chains have normalized.  Serum chemistry is completely within normal limits.  Normal CBC.  Overall no concern for any disease progression.  We again reviewed underlying issue and what to expect in the future.  For THE purposes he just has IgM MGUS.  Does not have any monoclonal plasma cells or B cells in the bone marrow based on prior bone biopsy and flow cytometry reports.  However I explained to him that this could change in the future.    Since his monoclonal protein is pretty stable over the last year or so I am planning to recheck it in 6 months.  He is agreeable to the plan.       Cancer Staging   No  matching staging information was found for the patient.    ECOG Performance    0 - Independent         Problem List    Patient Active Problem List   Diagnosis     Nasal congestion     Plantar fasciitis     Acute gouty arthritis     MGUS (monoclonal gammopathy of unknown significance)     Acute pain of right knee     Sialadenitis        Interval History   Tip Colin is a 46 year old with recent diagnosis of IgM kappa MGUS who is seen as a video visit for follow-up.    Continues to do well.  Denies any major issues.  He has some on and off night sweats but has had very occasional.  No significant weight loss.  No enlarging lymph nodes.  No significant fatigue.  Active.  He is here to review the lab results.      Review of Systems  A comprehensive review of systems was negative except for what is noted in the interval history    Current Outpatient Medications   Medication     Multiple Vitamins-Minerals (CENTRUM ADULTS PO)     Vitamin D (Cholecalciferol) 25 MCG (1000 UT) CAPS     Dextromethorphan HBr (VICKS DAYQUIL COUGH) 15 MG/15ML LIQD     No current facility-administered medications for this visit.        Physical Exam    No flowsheet data found.    General: alert and cooperative     Lab Results    No results found for this or any previous visit (from the past 168 hour(s)).    Imaging    No results found.     A total of 25 min were spent today on this visit which included face to face conversation with the patient, EMR review, counseling and co-ordination of care and medical documentation.      Signed by: Gerry Escobar MD      Again, thank you for allowing me to participate in the care of your patient.        Sincerely,        Gerry Escobar MD

## 2023-04-11 NOTE — NURSING NOTE
Is the patient currently in the state of [place]? YES    Visit mode:VIDEO    If the visit is dropped, the patient can be reconnected by: VIDEO VISIT: Text to cell phone: [phone]    Will anyone else be joining the visit? NO      How would you like to obtain your AVS? MyChart    Are changes needed to the allergy or medication list? NO    Reason for visit: return video visit    Tuyet Lemons, FRANK

## 2023-04-11 NOTE — PROGRESS NOTES
Virtual Visit Details    Type of service:  Video Visit   Video Start Time: 8:25 AM  Video End Time:8:33AM    Originating Location (pt. Location): Home    Distant Location (provider location):  Off-site  Platform used for Video Visit: [facility] Hematology and Oncology Progress Note    Patient: Tip Colin  MRN: [id number]  Date of Service: 05/27/2022        Reason for Visit    Chief Complaint   Patient presents with     Video Visit         Problem List Items Addressed This Visit        Immune    MGUS (monoclonal gammopathy of unknown significance) - Primary         Assessment and Plan  1. IgM kappa MGUS  Previously his bone marrow biopsy showed 5 to 10% plasma cells with slightly skewed kappa to lambda light chain expression ratio on the flow.  Polytypic plasma cells which also had partial CD20 expression.  No convincing evidence of multiple myeloma.  No evidence of lymphoma.  We decided to pursue observation only.    Clinically he is asymptomatic.  Labs from last week showed stable monoclonal protein at 0.4 g/dL.  Kappa lambda light chains have normalized.  Serum chemistry is completely within normal limits.  Normal CBC.  Overall no concern for any disease progression.  We again reviewed underlying issue and what to expect in the future.  For THE purposes he just has IgM MGUS.  Does not have any monoclonal plasma cells or B cells in the bone marrow based on prior bone biopsy and flow cytometry reports.  However I explained to him that this could change in the future.    Since his monoclonal protein is pretty stable over the last year or so I am planning to recheck it in 6 months.  He is agreeable to the plan.       Cancer Staging   No matching staging information was found for the patient.    ECOG Performance    0 - Independent         Problem List    Patient Active Problem List   Diagnosis     Nasal congestion     Plantar fasciitis     Acute gouty arthritis     MGUS (monoclonal  gammopathy of unknown significance)     Acute pain of right knee     Sialadenitis        Interval History   Tip Colin is a 46 year old with recent diagnosis of IgM kappa MGUS who is seen as a video visit for follow-up.    Continues to do well.  Denies any major issues.  He has some on and off night sweats but has had very occasional.  No significant weight loss.  No enlarging lymph nodes.  No significant fatigue.  Active.  He is here to review the lab results.      Review of Systems  A comprehensive review of systems was negative except for what is noted in the interval history    Current Outpatient Medications   Medication     Multiple Vitamins-Minerals (CENTRUM ADULTS PO)     Vitamin D (Cholecalciferol) 25 MCG (1000 UT) CAPS     Dextromethorphan HBr (VICKS DAYQUIL COUGH) 15 MG/15ML LIQD     No current facility-administered medications for this visit.        Physical Exam    No flowsheet data found.    General: alert and cooperative     Lab Results    No results found for this or any previous visit (from the past 168 hour(s)).    Imaging    No results found.     A total of 25 min were spent today on this visit which included face to face conversation with the patient, EMR review, counseling and co-ordination of care and medical documentation.      Signed by: Gerry Escobar MD

## 2023-04-11 NOTE — LETTER
4/11/2023         RE: Tip Colin  [address] [place] [address]        Dear Colleague,    Thank you for referring your patient, Tip Colin, to the [facility] [place]. Please see a copy of my visit note below.    Virtual Visit Details    Type of service:  Video Visit   Video Start Time: 8:25 AM  Video End Time:8:33AM    Originating Location (pt. Location): Home    Distant Location (provider location):  Off-site  Platform used for Video Visit: [facility] Hematology and Oncology Progress Note    Patient: Tip Colin  MRN: [id number]  Date of Service: 05/27/2022        Reason for Visit    Chief Complaint   Patient presents with     Video Visit         Problem List Items Addressed This Visit        Immune    MGUS (monoclonal gammopathy of unknown significance) - Primary         Assessment and Plan  1. IgM kappa MGUS  Previously his bone marrow biopsy showed 5 to 10% plasma cells with slightly skewed kappa to lambda light chain expression ratio on the flow.  Polytypic plasma cells which also had partial CD20 expression.  No convincing evidence of multiple myeloma.  No evidence of lymphoma.  We decided to pursue observation only.    Clinically he is asymptomatic.  Labs from last week showed stable monoclonal protein at 0.4 g/dL.  Kappa lambda light chains have normalized.  Serum chemistry is completely within normal limits.  Normal CBC.  Overall no concern for any disease progression.  We again reviewed underlying issue and what to expect in the future.  For THE purposes he just has IgM MGUS.  Does not have any monoclonal plasma cells or B cells in the bone marrow based on prior bone biopsy and flow cytometry reports.  However I explained to him that this could change in the future.    Since his monoclonal protein is pretty stable over the last year or so I am planning to recheck it in 6 months.  He is agreeable to the plan.       Cancer Staging   No  matching staging information was found for the patient.    ECOG Performance    0 - Independent         Problem List    Patient Active Problem List   Diagnosis     Nasal congestion     Plantar fasciitis     Acute gouty arthritis     MGUS (monoclonal gammopathy of unknown significance)     Acute pain of right knee     Sialadenitis        Interval History   Tip Colin is a 46 year old with recent diagnosis of IgM kappa MGUS who is seen as a video visit for follow-up.    Continues to do well.  Denies any major issues.  He has some on and off night sweats but has had very occasional.  No significant weight loss.  No enlarging lymph nodes.  No significant fatigue.  Active.  He is here to review the lab results.      Review of Systems  A comprehensive review of systems was negative except for what is noted in the interval history    Current Outpatient Medications   Medication     Multiple Vitamins-Minerals (CENTRUM ADULTS PO)     Vitamin D (Cholecalciferol) 25 MCG (1000 UT) CAPS     Dextromethorphan HBr (VICKS DAYQUIL COUGH) 15 MG/15ML LIQD     No current facility-administered medications for this visit.        Physical Exam    No flowsheet data found.    General: alert and cooperative     Lab Results    No results found for this or any previous visit (from the past 168 hour(s)).    Imaging    No results found.     A total of 25 min were spent today on this visit which included face to face conversation with the patient, EMR review, counseling and co-ordination of care and medical documentation.      Signed by: Gerry Escobar MD      Again, thank you for allowing me to participate in the care of your patient.        Sincerely,        Gerry Escobar MD

## 2023-06-04 ENCOUNTER — HEALTH MAINTENANCE LETTER (OUTPATIENT)
Age: 48
End: 2023-06-04

## 2023-07-13 DIAGNOSIS — D47.2 MGUS (MONOCLONAL GAMMOPATHY OF UNKNOWN SIGNIFICANCE): Primary | ICD-10-CM

## 2023-07-13 DIAGNOSIS — R79.9 ABNORMAL BLOOD CELL COUNT: ICD-10-CM

## 2023-07-13 DIAGNOSIS — R77.8 ABNORMAL SPEP: ICD-10-CM

## 2023-07-14 ENCOUNTER — LAB (OUTPATIENT)
Dept: INFUSION THERAPY | Facility: HOSPITAL | Age: 48
End: 2023-07-14
Payer: COMMERCIAL

## 2023-07-14 DIAGNOSIS — R79.9 ABNORMAL BLOOD CELL COUNT: ICD-10-CM

## 2023-07-14 DIAGNOSIS — D47.2 MGUS (MONOCLONAL GAMMOPATHY OF UNKNOWN SIGNIFICANCE): ICD-10-CM

## 2023-07-14 DIAGNOSIS — R77.8 ABNORMAL SPEP: ICD-10-CM

## 2023-07-14 LAB
ALBUMIN SERPL BCG-MCNC: 4.3 G/DL (ref 3.5–5.2)
ALP SERPL-CCNC: 70 U/L (ref 40–129)
ALT SERPL W P-5'-P-CCNC: 19 U/L (ref 0–70)
ANION GAP SERPL CALCULATED.3IONS-SCNC: 10 MMOL/L (ref 7–15)
AST SERPL W P-5'-P-CCNC: 22 U/L (ref 0–45)
BASOPHILS # BLD AUTO: 0 10E3/UL (ref 0–0.2)
BASOPHILS NFR BLD AUTO: 1 %
BILIRUB SERPL-MCNC: 0.7 MG/DL
BUN SERPL-MCNC: 16.7 MG/DL (ref 6–20)
CALCIUM SERPL-MCNC: 9.1 MG/DL (ref 8.6–10)
CHLORIDE SERPL-SCNC: 108 MMOL/L (ref 98–107)
CREAT SERPL-MCNC: 0.94 MG/DL (ref 0.67–1.17)
DEPRECATED HCO3 PLAS-SCNC: 26 MMOL/L (ref 22–29)
EOSINOPHIL # BLD AUTO: 0.1 10E3/UL (ref 0–0.7)
EOSINOPHIL NFR BLD AUTO: 1 %
ERYTHROCYTE [DISTWIDTH] IN BLOOD BY AUTOMATED COUNT: 11.8 % (ref 10–15)
GFR SERPL CREATININE-BSD FRML MDRD: >90 ML/MIN/1.73M2
GLUCOSE SERPL-MCNC: 105 MG/DL (ref 70–99)
HCT VFR BLD AUTO: 37.2 % (ref 40–53)
HGB BLD-MCNC: 13 G/DL (ref 13.3–17.7)
IMM GRANULOCYTES # BLD: 0 10E3/UL
IMM GRANULOCYTES NFR BLD: 0 %
LYMPHOCYTES # BLD AUTO: 1.1 10E3/UL (ref 0.8–5.3)
LYMPHOCYTES NFR BLD AUTO: 21 %
MCH RBC QN AUTO: 32.3 PG (ref 26.5–33)
MCHC RBC AUTO-ENTMCNC: 34.9 G/DL (ref 31.5–36.5)
MCV RBC AUTO: 92 FL (ref 78–100)
MONOCYTES # BLD AUTO: 0.5 10E3/UL (ref 0–1.3)
MONOCYTES NFR BLD AUTO: 8 %
NEUTROPHILS # BLD AUTO: 3.8 10E3/UL (ref 1.6–8.3)
NEUTROPHILS NFR BLD AUTO: 69 %
NRBC # BLD AUTO: 0 10E3/UL
NRBC BLD AUTO-RTO: 0 /100
PLATELET # BLD AUTO: 171 10E3/UL (ref 150–450)
POTASSIUM SERPL-SCNC: 3.9 MMOL/L (ref 3.4–5.3)
PROT SERPL-MCNC: 6.7 G/DL (ref 6.4–8.3)
RBC # BLD AUTO: 4.03 10E6/UL (ref 4.4–5.9)
SODIUM SERPL-SCNC: 144 MMOL/L (ref 136–145)
TOTAL PROTEIN SERUM FOR ELP: 6.3 G/DL (ref 6.4–8.3)
WBC # BLD AUTO: 5.5 10E3/UL (ref 4–11)

## 2023-07-14 PROCEDURE — 86334 IMMUNOFIX E-PHORESIS SERUM: CPT | Performed by: PATHOLOGY

## 2023-07-14 PROCEDURE — 85025 COMPLETE CBC W/AUTO DIFF WBC: CPT

## 2023-07-14 PROCEDURE — 80053 COMPREHEN METABOLIC PANEL: CPT

## 2023-07-14 PROCEDURE — 84155 ASSAY OF PROTEIN SERUM: CPT

## 2023-07-14 PROCEDURE — 36415 COLL VENOUS BLD VENIPUNCTURE: CPT

## 2023-07-14 PROCEDURE — 83521 IG LIGHT CHAINS FREE EACH: CPT

## 2023-07-14 PROCEDURE — 84165 PROTEIN E-PHORESIS SERUM: CPT | Mod: TC | Performed by: PATHOLOGY

## 2023-07-17 LAB
KAPPA LC FREE SER-MCNC: 1.65 MG/DL (ref 0.33–1.94)
KAPPA LC FREE/LAMBDA FREE SER NEPH: 1.57 {RATIO} (ref 0.26–1.65)
LAMBDA LC FREE SERPL-MCNC: 1.05 MG/DL (ref 0.57–2.63)

## 2023-07-18 LAB
ALBUMIN SERPL ELPH-MCNC: 4.1 G/DL (ref 3.7–5.1)
ALPHA1 GLOB SERPL ELPH-MCNC: 0.2 G/DL (ref 0.2–0.4)
ALPHA2 GLOB SERPL ELPH-MCNC: 0.4 G/DL (ref 0.5–0.9)
B-GLOBULIN SERPL ELPH-MCNC: 0.6 G/DL (ref 0.6–1)
GAMMA GLOB SERPL ELPH-MCNC: 1 G/DL (ref 0.7–1.6)
M PROTEIN SERPL ELPH-MCNC: 0.3 G/DL
PROT PATTERN SERPL ELPH-IMP: ABNORMAL
PROT PATTERN SERPL IFE-IMP: NORMAL

## 2023-07-18 PROCEDURE — 84165 PROTEIN E-PHORESIS SERUM: CPT | Mod: 26

## 2023-07-18 PROCEDURE — 86334 IMMUNOFIX E-PHORESIS SERUM: CPT | Mod: 26

## 2023-07-19 ENCOUNTER — VIRTUAL VISIT (OUTPATIENT)
Dept: ONCOLOGY | Facility: HOSPITAL | Age: 48
End: 2023-07-19
Attending: INTERNAL MEDICINE
Payer: COMMERCIAL

## 2023-07-19 DIAGNOSIS — D64.9 ANEMIA, UNSPECIFIED TYPE: Primary | ICD-10-CM

## 2023-07-19 DIAGNOSIS — D47.2 MGUS (MONOCLONAL GAMMOPATHY OF UNKNOWN SIGNIFICANCE): ICD-10-CM

## 2023-07-19 PROCEDURE — 99214 OFFICE O/P EST MOD 30 MIN: CPT | Mod: VID | Performed by: INTERNAL MEDICINE

## 2023-07-19 ASSESSMENT — PAIN SCALES - GENERAL: PAINLEVEL: NO PAIN (0)

## 2023-07-19 NOTE — PROGRESS NOTES
Virtual Visit Details    Type of service:  Video Visit   Video Start Time: 8:27 AM  Video End Time:8:49AM    Originating Location (pt. Location): Home    Distant Location (provider location):  Off-site  Platform used for Video Visit: [facility] Hematology and Oncology Progress Note    Patient: Tip Colin  MRN: [id number]  Date of Service: 05/27/2022        Reason for Visit    Chief Complaint   Patient presents with     Oncology Clinic Visit     MGUS (monoclonal gammopathy of unknown significance)         Problem List Items Addressed This Visit        Immune    MGUS (monoclonal gammopathy of unknown significance)   Other Visit Diagnoses     Anemia, unspecified type    -  Primary    Relevant Orders    Ferritin    Iron & Iron Binding Capacity    CBC with platelets and differential            Assessment and Plan  1. IgM kappa MGUS  Bone marrow biopsy done September 2022 showed 5 to 10% plasma cells with slightly skewed kappa to lambda light chain expression ratio on the flow (with a kappa to lambda ratio of 6:1, which is below the threshold of 10: 1).  Polytypic plasma cells which also had partial CD20 expression.  No convincing evidence of multiple myeloma.  No evidence of lymphoma.  We decided to pursue observation only.  FISH negative for 11: 14 translocation.    Abs from last week reviewed today.  Hemoglobin is slightly down at 13.  Normal platelet and white counts.  Creatinine is normal at 0.94.  Calcium is 9.1.  LFTs are normal.  Electrolytes are pretty much normal.  SPEP shows a monoclonal peak of 0.3 g/dL which is slightly down from last time.  Kappa and lambda free light chains are within normal limits.  Overall no signs of disease progression.  The etiology for his anemia is not clear.  Does not look like he has any obvious bleeding issues.  I do not think this is by itself concerning for progression of his underlying monoclonal gammopathy.  Also its not clear whether he actually  has any malignant process at all.  He had 5 to 10% plasma cells but they were polytypic on flow cytometry.      My recommendation is to continue to monitor with periodic labs.  I will recheck his CBC next month.  I have also added on iron studies and ferritin today.  If he is significantly anemic then would recommend GI consultation for endoscopy (upper and lower).  If there is any further worsening of his hemoglobin without any evidence of iron deficiency and monoclonal proteins then we will probably do a repeat bone marrow biopsy at that time.         Cancer Staging   No matching staging information was found for the patient.    ECOG Performance    0 - Independent         Problem List    Patient Active Problem List   Diagnosis     Nasal congestion     Plantar fasciitis     Acute gouty arthritis     MGUS (monoclonal gammopathy of unknown significance)     Acute pain of right knee     Sialadenitis        Interval History   Tip Colin is a 47 year old with recent diagnosis of IgM kappa MGUS who is seen as a video visit for follow-up.    Continues to do well.  Denies any major issues.  No fevers chills or night sweats.  No weight loss.  Denies any bright red blood per rectum.  No hematuria.  No bone pain.  Here with repeat labs.      Review of Systems  A comprehensive review of systems was negative except for what is noted in the interval history    Current Outpatient Medications   Medication     Multiple Vitamins-Minerals (CENTRUM ADULTS PO)     Vitamin D (Cholecalciferol) 25 MCG (1000 UT) CAPS     Dextromethorphan HBr (VICKS DAYQUIL COUGH) 15 MG/15ML LIQD     No current facility-administered medications for this visit.        Physical Exam    No flowsheet data found.    General: alert and cooperative     Lab Results    Recent Results (from the past 168 hour(s))   Kappa and lambda light chain   Result Value Ref Range    Kappa Free Light Chains 1.65 0.33 - 1.94 mg/dL    Lambda Free Light Chains 1.05 0.57 - 2.63  mg/dL    Kappa /Lambda Ratio 1.57 0.26 - 1.65   Protein Immunofixation Serum   Result Value Ref Range    Immunofixation ELP       Monoclonal IgM immunoglobulin of kappa light chain type. Pathologic significance requires clinical correlation. Tommie Johnson MD   Comprehensive metabolic panel (BMP + Alb, Alk Phos, ALT, AST, Total. Bili, TP)   Result Value Ref Range    Sodium 144 136 - 145 mmol/L    Potassium 3.9 3.4 - 5.3 mmol/L    Chloride 108 (H) 98 - 107 mmol/L    Carbon Dioxide (CO2) 26 22 - 29 mmol/L    Anion Gap 10 7 - 15 mmol/L    Urea Nitrogen 16.7 6.0 - 20.0 mg/dL    Creatinine 0.94 0.67 - 1.17 mg/dL    Calcium 9.1 8.6 - 10.0 mg/dL    Glucose 105 (H) 70 - 99 mg/dL    Alkaline Phosphatase 70 40 - 129 U/L    AST 22 0 - 45 U/L    ALT 19 0 - 70 U/L    Protein Total 6.7 6.4 - 8.3 g/dL    Albumin 4.3 3.5 - 5.2 g/dL    Bilirubin Total 0.7 <=1.2 mg/dL    GFR Estimate >90 >60 mL/min/1.73m2   CBC with platelets and differential   Result Value Ref Range    WBC Count 5.5 4.0 - 11.0 10e3/uL    RBC Count 4.03 (L) 4.40 - 5.90 10e6/uL    Hemoglobin 13.0 (L) 13.3 - 17.7 g/dL    Hematocrit 37.2 (L) 40.0 - 53.0 %    MCV 92 78 - 100 fL    MCH 32.3 26.5 - 33.0 pg    MCHC 34.9 31.5 - 36.5 g/dL    RDW 11.8 10.0 - 15.0 %    Platelet Count 171 150 - 450 10e3/uL    % Neutrophils 69 %    % Lymphocytes 21 %    % Monocytes 8 %    % Eosinophils 1 %    % Basophils 1 %    % Immature Granulocytes 0 %    NRBCs per 100 WBC 0 <1 /100    Absolute Neutrophils 3.8 1.6 - 8.3 10e3/uL    Absolute Lymphocytes 1.1 0.8 - 5.3 10e3/uL    Absolute Monocytes 0.5 0.0 - 1.3 10e3/uL    Absolute Eosinophils 0.1 0.0 - 0.7 10e3/uL    Absolute Basophils 0.0 0.0 - 0.2 10e3/uL    Absolute Immature Granulocytes 0.0 <=0.4 10e3/uL    Absolute NRBCs 0.0 10e3/uL   Total Protein, Serum for ELP   Result Value Ref Range    Total Protein Serum for ELP 6.3 (L) 6.4 - 8.3 g/dL   Protein Electrophoresis, Serum   Result Value Ref Range    Albumin 4.1 3.7 - 5.1 g/dL    Alpha  1 0.2 0.2 - 0.4 g/dL    Alpha 2 0.4 (L) 0.5 - 0.9 g/dL    Beta Globulin 0.6 0.6 - 1.0 g/dL    Gamma Globulin 1.0 0.7 - 1.6 g/dL    Monoclonal Peak 0.3 (H) <=0.0 g/dL    ELP Interpretation       Small monoclonal protein (0.3 g/dL) seen in the gamma fraction. See immunofixation report on same specimen. Decreased alpha 2 fraction. Pathologic significance requires clinical correlation. Tommie Johnson MD       Imaging    No results found.     A total of 30 min were spent today on this visit which included face to face conversation with the patient, EMR review, counseling and co-ordination of care and medical documentation.      Signed by: Gerry Escobar MD

## 2023-07-19 NOTE — PROGRESS NOTES
Virtual Visit Details    Type of service:  Video Visit   Video Start Time:   Video End Time:    Originating Location (pt. Location): Home    Distant Location (provider location):  On-site  Platform used for Video Visit: Other: rina Spears LPN on 7/19/2023 at 8:24 AM

## 2023-08-21 ENCOUNTER — LAB (OUTPATIENT)
Dept: INFUSION THERAPY | Facility: HOSPITAL | Age: 48
End: 2023-08-21
Attending: INTERNAL MEDICINE
Payer: COMMERCIAL

## 2023-08-21 DIAGNOSIS — D64.9 ANEMIA, UNSPECIFIED TYPE: ICD-10-CM

## 2023-08-21 LAB
BASOPHILS # BLD AUTO: 0 10E3/UL (ref 0–0.2)
BASOPHILS NFR BLD AUTO: 1 %
EOSINOPHIL # BLD AUTO: 0.1 10E3/UL (ref 0–0.7)
EOSINOPHIL NFR BLD AUTO: 2 %
ERYTHROCYTE [DISTWIDTH] IN BLOOD BY AUTOMATED COUNT: 11.9 % (ref 10–15)
FERRITIN SERPL-MCNC: 288 NG/ML (ref 31–409)
HCT VFR BLD AUTO: 40.4 % (ref 40–53)
HGB BLD-MCNC: 14.4 G/DL (ref 13.3–17.7)
IMM GRANULOCYTES # BLD: 0 10E3/UL
IMM GRANULOCYTES NFR BLD: 0 %
IRON BINDING CAPACITY (ROCHE): 200 UG/DL (ref 240–430)
IRON SATN MFR SERPL: 45 % (ref 15–46)
IRON SERPL-MCNC: 89 UG/DL (ref 61–157)
LYMPHOCYTES # BLD AUTO: 1.4 10E3/UL (ref 0.8–5.3)
LYMPHOCYTES NFR BLD AUTO: 26 %
MCH RBC QN AUTO: 32.4 PG (ref 26.5–33)
MCHC RBC AUTO-ENTMCNC: 35.6 G/DL (ref 31.5–36.5)
MCV RBC AUTO: 91 FL (ref 78–100)
MONOCYTES # BLD AUTO: 0.5 10E3/UL (ref 0–1.3)
MONOCYTES NFR BLD AUTO: 9 %
NEUTROPHILS # BLD AUTO: 3.3 10E3/UL (ref 1.6–8.3)
NEUTROPHILS NFR BLD AUTO: 62 %
NRBC # BLD AUTO: 0 10E3/UL
NRBC BLD AUTO-RTO: 0 /100
PLATELET # BLD AUTO: 179 10E3/UL (ref 150–450)
RBC # BLD AUTO: 4.44 10E6/UL (ref 4.4–5.9)
WBC # BLD AUTO: 5.3 10E3/UL (ref 4–11)

## 2023-08-21 PROCEDURE — 36415 COLL VENOUS BLD VENIPUNCTURE: CPT

## 2023-08-21 PROCEDURE — 83550 IRON BINDING TEST: CPT

## 2023-08-21 PROCEDURE — 85014 HEMATOCRIT: CPT

## 2023-08-21 PROCEDURE — 82728 ASSAY OF FERRITIN: CPT

## 2023-09-19 ENCOUNTER — MYC MEDICAL ADVICE (OUTPATIENT)
Dept: FAMILY MEDICINE | Facility: CLINIC | Age: 48
End: 2023-09-19
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
## 2023-10-02 ENCOUNTER — OFFICE VISIT (OUTPATIENT)
Dept: FAMILY MEDICINE | Facility: CLINIC | Age: 48
End: 2023-10-02
Payer: COMMERCIAL

## 2023-10-02 VITALS
TEMPERATURE: 98.1 F | BODY MASS INDEX: 23.84 KG/M2 | RESPIRATION RATE: 15 BRPM | HEIGHT: 72 IN | OXYGEN SATURATION: 97 % | SYSTOLIC BLOOD PRESSURE: 109 MMHG | HEART RATE: 76 BPM | WEIGHT: 176 LBS | DIASTOLIC BLOOD PRESSURE: 66 MMHG

## 2023-10-02 DIAGNOSIS — G89.29 CHRONIC PAIN OF BOTH KNEES: ICD-10-CM

## 2023-10-02 DIAGNOSIS — F34.1 DYSTHYMIA: Primary | ICD-10-CM

## 2023-10-02 DIAGNOSIS — L98.9 SKIN LESION: ICD-10-CM

## 2023-10-02 DIAGNOSIS — M25.562 CHRONIC PAIN OF BOTH KNEES: ICD-10-CM

## 2023-10-02 DIAGNOSIS — M25.561 CHRONIC PAIN OF BOTH KNEES: ICD-10-CM

## 2023-10-02 ASSESSMENT — PATIENT HEALTH QUESTIONNAIRE - PHQ9
5. POOR APPETITE OR OVEREATING: NOT AT ALL
SUM OF ALL RESPONSES TO PHQ QUESTIONS 1-9: 5

## 2023-10-02 ASSESSMENT — ANXIETY QUESTIONNAIRES
6. BECOMING EASILY ANNOYED OR IRRITABLE: SEVERAL DAYS
GAD7 TOTAL SCORE: 2
3. WORRYING TOO MUCH ABOUT DIFFERENT THINGS: NOT AT ALL
GAD7 TOTAL SCORE: 2
7. FEELING AFRAID AS IF SOMETHING AWFUL MIGHT HAPPEN: NOT AT ALL
IF YOU CHECKED OFF ANY PROBLEMS ON THIS QUESTIONNAIRE, HOW DIFFICULT HAVE THESE PROBLEMS MADE IT FOR YOU TO DO YOUR WORK, TAKE CARE OF THINGS AT HOME, OR GET ALONG WITH OTHER PEOPLE: NOT DIFFICULT AT ALL
5. BEING SO RESTLESS THAT IT IS HARD TO SIT STILL: NOT AT ALL
2. NOT BEING ABLE TO STOP OR CONTROL WORRYING: NOT AT ALL
1. FEELING NERVOUS, ANXIOUS, OR ON EDGE: SEVERAL DAYS

## 2023-10-02 NOTE — NURSING NOTE
48 year old  Chief Complaint   Patient presents with    RECHECK     PT would like to check in about overall health concerns.        Blood pressure 109/66, pulse 76, temperature 98.1  F (36.7  C), temperature source Skin, resp. rate 15, height 1.829 m (6'), weight 79.8 kg (176 lb), SpO2 97 %. Body mass index is 23.87 kg/m .  Patient Active Problem List   Diagnosis    Nasal congestion    Plantar fasciitis    Acute gouty arthritis    MGUS (monoclonal gammopathy of unknown significance)    Acute pain of right knee    Sialadenitis       Wt Readings from Last 2 Encounters:   10/02/23 79.8 kg (176 lb)   12/12/22 79.9 kg (176 lb 1.3 oz)     BP Readings from Last 3 Encounters:   10/02/23 109/66   12/12/22 126/71   09/23/22 103/56         Current Outpatient Medications   Medication    Multiple Vitamins-Minerals (CENTRUM ADULTS PO)    Vitamin D (Cholecalciferol) 25 MCG (1000 UT) CAPS    Dextromethorphan HBr (VICKS DAYQUIL COUGH) 15 MG/15ML LIQD     No current facility-administered medications for this visit.       Social History     Tobacco Use    Smoking status: Never    Smokeless tobacco: Never   Vaping Use    Vaping Use: Never used   Substance Use Topics    Alcohol use: Yes     Comment: maybe three drinks a week    Drug use: No       Health Maintenance Due   Topic Date Due    ADVANCE CARE PLANNING  Never done    HEPATITIS B IMMUNIZATION (1 of 3 - 3-dose series) Never done    HEPATITIS C SCREENING  Never done    YEARLY PREVENTIVE VISIT  04/20/2023    INFLUENZA VACCINE (1) 09/01/2023    COVID-19 Vaccine (3 - 2023-24 season) 09/01/2023       No results found for: PAP      October 2, 2023 3:47 PM

## 2023-10-23 ENCOUNTER — OFFICE VISIT (OUTPATIENT)
Dept: DERMATOLOGY | Facility: CLINIC | Age: 48
End: 2023-10-23
Payer: COMMERCIAL

## 2023-10-23 DIAGNOSIS — D22.9 MULTIPLE BENIGN NEVI: Primary | ICD-10-CM

## 2023-10-23 DIAGNOSIS — L81.4 LENTIGINES: ICD-10-CM

## 2023-10-23 DIAGNOSIS — D23.9 DERMATOFIBROMA: ICD-10-CM

## 2023-10-23 DIAGNOSIS — L57.8 ACTINIC SKIN DAMAGE: ICD-10-CM

## 2023-10-23 PROCEDURE — 99213 OFFICE O/P EST LOW 20 MIN: CPT | Performed by: STUDENT IN AN ORGANIZED HEALTH CARE EDUCATION/TRAINING PROGRAM

## 2023-10-23 NOTE — LETTER
10/23/2023         RE: Tip Colin  [address] [place] [address]        Dear Colleague,    Thank you for referring your patient, Tip Colin, to the [facility]. Please see a copy of my visit note below.    [facility] Dermatology Note    Encounter Date: Oct 23, 2023    Dermatology Problem List:  _________________________________    Impression/Plan:  Tip was seen today for derm problem.    Diagnoses and all orders for this visit:    Multiple benign nevi  Lentigines  - Reviewed the compounding benefits of incremental changes to sun protective clothing behaviors including increased frequency of sunscreen and sun protective clothing like broad brimmed hats and longsleeved UPF containing clothing    Dermatofibroma  -Right arm reassured that this is benign, no changes since the last evaluation a year ago    Actinic skin damage  -Not wearing sunscreen regularly advised him this is a good idea  - Reviewed the compounding benefits of incremental changes to sun protective clothing behaviors including increased frequency of sunscreen and sun protective clothing like broad brimmed hats and longsleeved UPF containing clothing        Follow-up PRN.       Staff Involved:  Staff Only    Renard Rodriguez MD   of Dermatology  Department of Dermatology  [facility]      CC:   Chief Complaint   Patient presents with     Derm Problem     Skin check        History of Present Illness:  Mr. Tip Colin is a 48 year old male who presents as a new patient.    Patient was last seen October 2022 for benign spots including dermatofibroma nevi.  Has not noticed anything growing or bleeding.  Recently diagnosed with MGUS has questions about what that means for his health.  Discussed that outside of my area of expertise, however at the low level that he has likely he will just be observed.  Reassured him that he has not  experienced any side effects associated with this and that if he never had his blood tested for he may have never noted    Labs:      Physical exam:  Vitals: There were no vitals taken for this visit.  GEN: well developed, well-nourished, in no acute distress, in a pleasant mood.     SKIN: Kirby phototype 1  - Full skin, which includes the head/face, both arms, chest, back, abdomen,both legs, genitalia and/or groin buttocks, digits and/or nails, was examined.    - No other lesions of concern on areas examined.     Past Medical History:   Past Medical History:   Diagnosis Date     Acute gouty arthritis      Past Surgical History:   Procedure Laterality Date     DENTAL SURGERY       KNEE SURGERY Left     bone spur removed       Social History:   reports that he has never smoked. He has never used smokeless tobacco. He reports current alcohol use. He reports that he does not use drugs.    Family History:  Family History   Problem Relation Age of Onset     Uterine Cancer Mother      Breast Cancer Sister 53     Melanoma Sister      Retinal detachment Brother      Cerebrovascular Disease Paternal Grandfather      Glaucoma No family hx of      Macular Degeneration No family hx of        Medications:  Current Outpatient Medications   Medication Sig Dispense Refill     Multiple Vitamins-Minerals (CENTRUM ADULTS PO) Take 1 tablet by mouth daily       Vitamin D (Cholecalciferol) 25 MCG (1000 UT) CAPS Take 1 capsule by mouth daily       No Known Allergies              Again, thank you for allowing me to participate in the care of your patient.        Sincerely,        Renard Rodriguez MD

## 2023-10-23 NOTE — PROGRESS NOTES
[facility] Dermatology Note    Encounter Date: Oct 23, 2023    Dermatology Problem List:  _________________________________    Impression/Plan:  Tip was seen today for derm problem.    Diagnoses and all orders for this visit:    Multiple benign nevi  Lentigines  - Reviewed the compounding benefits of incremental changes to sun protective clothing behaviors including increased frequency of sunscreen and sun protective clothing like broad brimmed hats and longsleeved UPF containing clothing    Dermatofibroma  -Right arm reassured that this is benign, no changes since the last evaluation a year ago    Actinic skin damage  -Not wearing sunscreen regularly advised him this is a good idea  - Reviewed the compounding benefits of incremental changes to sun protective clothing behaviors including increased frequency of sunscreen and sun protective clothing like broad brimmed hats and longsleeved UPF containing clothing        Follow-up PRN.       Staff Involved:  Staff Only    Renard Rodriguez MD   of Dermatology  Department of Dermatology  [facility]      CC:   Chief Complaint   Patient presents with    Derm Problem     Skin check        History of Present Illness:  Mr. Tip Colin is a 48 year old male who presents as a new patient.    Patient was last seen October 2022 for benign spots including dermatofibroma nevi.  Has not noticed anything growing or bleeding.  Recently diagnosed with MGUS has questions about what that means for his health.  Discussed that outside of my area of expertise, however at the low level that he has likely he will just be observed.  Reassured him that he has not experienced any side effects associated with this and that if he never had his blood tested for he may have never noted    Labs:      Physical exam:  Vitals: There were no vitals taken for this visit.  GEN: well developed, well-nourished, in no acute distress, in a  pleasant mood.     SKIN: Kirby phototype 1  - Full skin, which includes the head/face, both arms, chest, back, abdomen,both legs, genitalia and/or groin buttocks, digits and/or nails, was examined.    - No other lesions of concern on areas examined.     Past Medical History:   Past Medical History:   Diagnosis Date    Acute gouty arthritis      Past Surgical History:   Procedure Laterality Date    DENTAL SURGERY      KNEE SURGERY Left     bone spur removed       Social History:   reports that he has never smoked. He has never used smokeless tobacco. He reports current alcohol use. He reports that he does not use drugs.    Family History:  Family History   Problem Relation Age of Onset    Uterine Cancer Mother     Breast Cancer Sister 53    Melanoma Sister     Retinal detachment Brother     Cerebrovascular Disease Paternal Grandfather     Glaucoma No family hx of     Macular Degeneration No family hx of        Medications:  Current Outpatient Medications   Medication Sig Dispense Refill    Multiple Vitamins-Minerals (CENTRUM ADULTS PO) Take 1 tablet by mouth daily      Vitamin D (Cholecalciferol) 25 MCG (1000 UT) CAPS Take 1 capsule by mouth daily       No Known Allergies

## 2023-10-23 NOTE — PATIENT INSTRUCTIONS
" SUNSCREENS  UVA and UVB PROTECTION    Sunlight consists of two types of light that can cause or worsen many skin problems:   - UVA (ultraviolet A): Less variation with seasons  All year round  All day strong  Passes through glass and clouds --this is important to remember while you drive   -  UVB (ultraviolet B):  Strongest in summer  Peak hours 10 am to 4pm  SPF rates UVB protection, SPF 30 blocks 97% of UVB rays (if applied correctly).     Things that can be caused or worsened by UV light, either by the sun or by tanning beds:  Skin cancers  Sunburns   Increased number of moles, brown spots, age spots  Wrinkles and other types of aging, thinning/weakening/increasing fragility of the skin, easier bruising, \"weather-beaten look\"  Rosacea  Certain autoimmune conditions     Sunscreens that block both UVA and UVB light contain these ingredients:  Zinc Oxide (should contain at least 4% zinc oxide)  Titanium Dioxide  Parsol or Avobenzone (additives improve stability of Avobenzone)  There are other UVA blocking ingredients but they are not as complete as these three.     Tips/Suggestions:   SPF of 30 or higher, but some literature suggests that even higher numbers might provide even better protection  Zinc Oxide or other UVA block such as Helioplex or Anthelios in product  Remember there is no safe UV light so there is no such thing as a safe suntan.     Apply sunscreen daily (even in winter and on cloudy days) and reapply depending on activity--every 1.5 to 2 hours if out in sun for a long time.  Apply sunscreen at least 15 to 30 minutes before sun exposure  Approximately one ounce (a shot glass) is necessary to adequately cover the entire body.  Seek shade when possible      Examples of sunscreens:  - Elta MD (you can order it online or buy it at cosmetic [employer]. It has microsized Zinc oxide and/or titanium dioxide)  - Neutrogena sun block lotion for sensitive skin with SPF 30  - Oil of " "Olay complete defense daily UV moisturizer with SPF 30  -Cetaphil SPF 30 for normal skin or SPF 50 for dry skin  Sunblocks that only have \"physical\" ingredients (zinc oxide, titanium dioxide), no \"chemical\" sunscreen ingredients:  CeraVe Mineral   LA ROCHE-POSAY Mineral  Neutrogena Sheer Zinc Dry-Touch  Vanicream Broad Spectrum 50+  Goddess Garden  Sunscreens that are light-weight and often desirable for people who are very active or sweat a lot:   Neutrogena Sport  Neutrogena Hydroboost       "

## 2024-01-04 DIAGNOSIS — D47.2 MGUS (MONOCLONAL GAMMOPATHY OF UNKNOWN SIGNIFICANCE): Primary | ICD-10-CM

## 2024-01-15 ENCOUNTER — LAB (OUTPATIENT)
Dept: INFUSION THERAPY | Facility: HOSPITAL | Age: 49
End: 2024-01-15
Attending: INTERNAL MEDICINE
Payer: COMMERCIAL

## 2024-01-15 DIAGNOSIS — D47.2 MGUS (MONOCLONAL GAMMOPATHY OF UNKNOWN SIGNIFICANCE): ICD-10-CM

## 2024-01-15 LAB
ALBUMIN SERPL BCG-MCNC: 4.5 G/DL (ref 3.5–5.2)
ALP SERPL-CCNC: 87 U/L (ref 40–150)
ALT SERPL W P-5'-P-CCNC: 32 U/L (ref 0–70)
ANION GAP SERPL CALCULATED.3IONS-SCNC: 8 MMOL/L (ref 7–15)
AST SERPL W P-5'-P-CCNC: 20 U/L (ref 0–45)
BASOPHILS # BLD AUTO: 0.1 10E3/UL (ref 0–0.2)
BASOPHILS NFR BLD AUTO: 1 %
BILIRUB SERPL-MCNC: 1 MG/DL
BUN SERPL-MCNC: 12.5 MG/DL (ref 6–20)
CALCIUM SERPL-MCNC: 9.3 MG/DL (ref 8.6–10)
CHLORIDE SERPL-SCNC: 104 MMOL/L (ref 98–107)
CREAT SERPL-MCNC: 0.95 MG/DL (ref 0.67–1.17)
DEPRECATED HCO3 PLAS-SCNC: 31 MMOL/L (ref 22–29)
EGFRCR SERPLBLD CKD-EPI 2021: >90 ML/MIN/1.73M2
EOSINOPHIL # BLD AUTO: 0.1 10E3/UL (ref 0–0.7)
EOSINOPHIL NFR BLD AUTO: 1 %
ERYTHROCYTE [DISTWIDTH] IN BLOOD BY AUTOMATED COUNT: 12.2 % (ref 10–15)
GLUCOSE SERPL-MCNC: 101 MG/DL (ref 70–99)
HCT VFR BLD AUTO: 46.1 % (ref 40–53)
HGB BLD-MCNC: 16.1 G/DL (ref 13.3–17.7)
IMM GRANULOCYTES # BLD: 0 10E3/UL
IMM GRANULOCYTES NFR BLD: 1 %
LYMPHOCYTES # BLD AUTO: 1.6 10E3/UL (ref 0.8–5.3)
LYMPHOCYTES NFR BLD AUTO: 23 %
MCH RBC QN AUTO: 32.1 PG (ref 26.5–33)
MCHC RBC AUTO-ENTMCNC: 34.9 G/DL (ref 31.5–36.5)
MCV RBC AUTO: 92 FL (ref 78–100)
MONOCYTES # BLD AUTO: 0.5 10E3/UL (ref 0–1.3)
MONOCYTES NFR BLD AUTO: 7 %
NEUTROPHILS # BLD AUTO: 4.7 10E3/UL (ref 1.6–8.3)
NEUTROPHILS NFR BLD AUTO: 67 %
NRBC # BLD AUTO: 0 10E3/UL
NRBC BLD AUTO-RTO: 0 /100
PLATELET # BLD AUTO: 239 10E3/UL (ref 150–450)
POTASSIUM SERPL-SCNC: 3.9 MMOL/L (ref 3.4–5.3)
PROT SERPL-MCNC: 7.3 G/DL (ref 6.4–8.3)
RBC # BLD AUTO: 5.01 10E6/UL (ref 4.4–5.9)
SODIUM SERPL-SCNC: 143 MMOL/L (ref 135–145)
TOTAL PROTEIN SERUM FOR ELP: 6.9 G/DL (ref 6.4–8.3)
WBC # BLD AUTO: 7 10E3/UL (ref 4–11)

## 2024-01-15 PROCEDURE — 84155 ASSAY OF PROTEIN SERUM: CPT

## 2024-01-15 PROCEDURE — 83521 IG LIGHT CHAINS FREE EACH: CPT

## 2024-01-15 PROCEDURE — 84165 PROTEIN E-PHORESIS SERUM: CPT | Mod: TC | Performed by: PATHOLOGY

## 2024-01-15 PROCEDURE — 82784 ASSAY IGA/IGD/IGG/IGM EACH: CPT

## 2024-01-15 PROCEDURE — 85025 COMPLETE CBC W/AUTO DIFF WBC: CPT

## 2024-01-15 PROCEDURE — 36415 COLL VENOUS BLD VENIPUNCTURE: CPT

## 2024-01-15 PROCEDURE — 86334 IMMUNOFIX E-PHORESIS SERUM: CPT | Performed by: PATHOLOGY

## 2024-01-15 PROCEDURE — 80053 COMPREHEN METABOLIC PANEL: CPT

## 2024-01-16 LAB
IGA SERPL-MCNC: 170 MG/DL (ref 84–499)
IGG SERPL-MCNC: 879 MG/DL (ref 610–1616)
IGM SERPL-MCNC: 737 MG/DL (ref 35–242)
KAPPA LC FREE SER-MCNC: 1.83 MG/DL (ref 0.33–1.94)
KAPPA LC FREE/LAMBDA FREE SER NEPH: 1.91 {RATIO} (ref 0.26–1.65)
LAMBDA LC FREE SERPL-MCNC: 0.96 MG/DL (ref 0.57–2.63)
PROT PATTERN SERPL IFE-IMP: NORMAL

## 2024-01-16 PROCEDURE — 86334 IMMUNOFIX E-PHORESIS SERUM: CPT | Mod: 26 | Performed by: PATHOLOGY

## 2024-01-17 LAB
ALBUMIN SERPL ELPH-MCNC: 4.4 G/DL (ref 3.7–5.1)
ALPHA1 GLOB SERPL ELPH-MCNC: 0.2 G/DL (ref 0.2–0.4)
ALPHA2 GLOB SERPL ELPH-MCNC: 0.5 G/DL (ref 0.5–0.9)
B-GLOBULIN SERPL ELPH-MCNC: 0.7 G/DL (ref 0.6–1)
GAMMA GLOB SERPL ELPH-MCNC: 1.2 G/DL (ref 0.7–1.6)
M PROTEIN SERPL ELPH-MCNC: 0.5 G/DL
PROT PATTERN SERPL ELPH-IMP: ABNORMAL

## 2024-01-17 PROCEDURE — 84165 PROTEIN E-PHORESIS SERUM: CPT | Mod: 26 | Performed by: PATHOLOGY

## 2024-01-26 ENCOUNTER — VIRTUAL VISIT (OUTPATIENT)
Dept: ONCOLOGY | Facility: HOSPITAL | Age: 49
End: 2024-01-26
Attending: INTERNAL MEDICINE
Payer: COMMERCIAL

## 2024-01-26 DIAGNOSIS — D47.2 MGUS (MONOCLONAL GAMMOPATHY OF UNKNOWN SIGNIFICANCE): Primary | ICD-10-CM

## 2024-01-26 PROCEDURE — 99214 OFFICE O/P EST MOD 30 MIN: CPT | Mod: 95 | Performed by: INTERNAL MEDICINE

## 2024-01-26 ASSESSMENT — PAIN SCALES - GENERAL: PAINLEVEL: NO PAIN (0)

## 2024-01-26 NOTE — NURSING NOTE
Is the patient currently in the state of [place]? YES    Visit mode:VIDEO    If the visit is dropped, the patient can be reconnected by: VIDEO VISIT: Text to cell phone:   Telephone Information:   Mobile [phone]       Will anyone else be joining the visit? NO  (If patient encounters technical issues they should call [phone]    How would you like to obtain your AVS? MyChart    Are changes needed to the allergy or medication list? No    Reason for visit: No chief complaint on file.    Cortez CHUNG

## 2024-01-26 NOTE — PROGRESS NOTES
Virtual Visit Details    Type of service:  Video Visit   Video Start Time:  2:34 PM  Video End Time: 2:53 PM    Originating Location (pt. Location): Home    Distant Location (provider location):  On-site  Platform used for Video Visit: [facility] Hematology and Oncology Progress Note    Patient: Tip Colin  MRN: [id number]  Date of Service: 05/27/2022        Reason for Visit    Chief Complaint   Patient presents with    RECHECK     Follow up         Problem List Items Addressed This Visit          Immune    MGUS (monoclonal gammopathy of unknown significance) - Primary    Relevant Orders    Protein electrophoresis    Kappa and lambda light chain    IgM    Comprehensive metabolic panel (BMP + Alb, Alk Phos, ALT, AST, Total. Bili, TP)    CBC with platelets and differential    Protein Immunofixation Serum         Assessment and Plan  1. IgM kappa MGUS  Bone marrow biopsy done September 2022 showed 5 to 10% plasma cells with slightly skewed kappa to lambda light chain expression ratio on the flow (with a kappa to lambda ratio of 6:1, which is below the threshold of 10: 1).  Flow cytometry showed polytypic plasma cells which also had partial CD20 expression.  No convincing evidence of multiple myeloma.  No evidence of lymphoma.  We decided to pursue observation only.  FISH negative for 11: 14 translocation.    Reviewed labs from last week.  SPEP showing monoclonal peak of 0.5 g/dL which is slightly increased from 6 months ago which was 8.3.  His monoclonal protein has fluctuated up and down.  IgM levels were elevated at 737 which is consistent with the monoclonal peak.  Serum chemistry and CBC are completely within normal limits except for mildly elevated glucose but this was a random sugar although he did not eat or drink anything prior to the test.  Clinically he does not have any B symptoms or other evidence of clinical myeloma.  Overall I think still we are dealing with MGUS based on  current information.  However I explained to him and his wife that things could change in the future and there is a need to continue to follow the monoclonal protein closely.  It may stay as MGUS or could progress to either multiple myeloma or lymphoma.  The previous bone marrow biopsy did not show any convincing evidence of either.  He had polytypic plasma cells on the flow cytometry and no evidence of any monoclonal B cells.  FISH was -1114 translocation.  So we will plan on repeating labs in 3 months and watch the trend.  If there is any significant jump in his monoclonal protein then definitely we should consider bone marrow biopsy and go from there.    Is in agreement with the plan.  He does have some mildly elevated sugar levels randomly.  I think he may need workup for underlying diabetes/prediabetes.          Cancer Staging   No matching staging information was found for the patient.      ECOG Performance    0 - Independent         Problem List    Patient Active Problem List   Diagnosis    Nasal congestion    Plantar fasciitis    Acute gouty arthritis    MGUS (monoclonal gammopathy of unknown significance)    Acute pain of right knee    Sialadenitis        Interval History   Tip Colin is a 47 year old with recent diagnosis of IgM kappa MGUS who is seen as a video visit for follow-up.    No significant issues since last visit.  He had a couple episodes of GI issues which was thought to be some type of infection but these have resolved.  No clinically significant night sweats or other B symptoms.  No weight loss.  No enlarging lymph nodes reported.  He had repeat labs a week ago.  Here to review the results.      Review of Systems  A comprehensive review of systems was negative except for what is noted in the interval history    Current Outpatient Medications   Medication    Multiple Vitamins-Minerals (CENTRUM ADULTS PO)    Vitamin D (Cholecalciferol) 25 MCG (1000 UT) CAPS     No current  facility-administered medications for this visit.        Physical Exam        General: alert and cooperative     Lab Results    No results found for this or any previous visit (from the past 168 hour(s)).      Imaging    No results found.     A total of 30 min were spent today on this visit which included face to face conversation with the patient, EMR review, counseling and co-ordination of care and medical documentation.    Signed by: Gerry Escobar MD

## 2024-03-27 ENCOUNTER — PATIENT OUTREACH (OUTPATIENT)
Dept: ONCOLOGY | Facility: HOSPITAL | Age: 49
End: 2024-03-27
Payer: COMMERCIAL

## 2024-04-22 ENCOUNTER — LAB (OUTPATIENT)
Dept: INFUSION THERAPY | Facility: HOSPITAL | Age: 49
End: 2024-04-22
Attending: INTERNAL MEDICINE
Payer: COMMERCIAL

## 2024-04-22 DIAGNOSIS — D47.2 MGUS (MONOCLONAL GAMMOPATHY OF UNKNOWN SIGNIFICANCE): ICD-10-CM

## 2024-04-22 LAB
ALBUMIN SERPL BCG-MCNC: 4.6 G/DL (ref 3.5–5.2)
ALP SERPL-CCNC: 86 U/L (ref 40–150)
ALT SERPL W P-5'-P-CCNC: 18 U/L (ref 0–70)
ANION GAP SERPL CALCULATED.3IONS-SCNC: 8 MMOL/L (ref 7–15)
AST SERPL W P-5'-P-CCNC: 17 U/L (ref 0–45)
BASOPHILS # BLD AUTO: 0 10E3/UL (ref 0–0.2)
BASOPHILS NFR BLD AUTO: 1 %
BILIRUB SERPL-MCNC: 0.9 MG/DL
BUN SERPL-MCNC: 10.5 MG/DL (ref 6–20)
CALCIUM SERPL-MCNC: 9.1 MG/DL (ref 8.6–10)
CHLORIDE SERPL-SCNC: 105 MMOL/L (ref 98–107)
CREAT SERPL-MCNC: 0.85 MG/DL (ref 0.67–1.17)
DEPRECATED HCO3 PLAS-SCNC: 29 MMOL/L (ref 22–29)
EGFRCR SERPLBLD CKD-EPI 2021: >90 ML/MIN/1.73M2
EOSINOPHIL # BLD AUTO: 0.1 10E3/UL (ref 0–0.7)
EOSINOPHIL NFR BLD AUTO: 2 %
ERYTHROCYTE [DISTWIDTH] IN BLOOD BY AUTOMATED COUNT: 11.9 % (ref 10–15)
GLUCOSE SERPL-MCNC: 102 MG/DL (ref 70–99)
HCT VFR BLD AUTO: 42.7 % (ref 40–53)
HGB BLD-MCNC: 14.7 G/DL (ref 13.3–17.7)
IMM GRANULOCYTES # BLD: 0 10E3/UL
IMM GRANULOCYTES NFR BLD: 0 %
LYMPHOCYTES # BLD AUTO: 1.5 10E3/UL (ref 0.8–5.3)
LYMPHOCYTES NFR BLD AUTO: 24 %
MCH RBC QN AUTO: 31.3 PG (ref 26.5–33)
MCHC RBC AUTO-ENTMCNC: 34.4 G/DL (ref 31.5–36.5)
MCV RBC AUTO: 91 FL (ref 78–100)
MONOCYTES # BLD AUTO: 0.5 10E3/UL (ref 0–1.3)
MONOCYTES NFR BLD AUTO: 8 %
NEUTROPHILS # BLD AUTO: 3.9 10E3/UL (ref 1.6–8.3)
NEUTROPHILS NFR BLD AUTO: 65 %
NRBC # BLD AUTO: 0 10E3/UL
NRBC BLD AUTO-RTO: 0 /100
PLATELET # BLD AUTO: 210 10E3/UL (ref 150–450)
POTASSIUM SERPL-SCNC: 4.3 MMOL/L (ref 3.4–5.3)
PROT SERPL-MCNC: 7.5 G/DL (ref 6.4–8.3)
RBC # BLD AUTO: 4.7 10E6/UL (ref 4.4–5.9)
SODIUM SERPL-SCNC: 142 MMOL/L (ref 135–145)
WBC # BLD AUTO: 6 10E3/UL (ref 4–11)

## 2024-04-22 PROCEDURE — 85025 COMPLETE CBC W/AUTO DIFF WBC: CPT

## 2024-04-22 PROCEDURE — 80053 COMPREHEN METABOLIC PANEL: CPT

## 2024-04-22 PROCEDURE — 84155 ASSAY OF PROTEIN SERUM: CPT

## 2024-04-22 PROCEDURE — 84165 PROTEIN E-PHORESIS SERUM: CPT | Mod: TC | Performed by: PATHOLOGY

## 2024-04-22 PROCEDURE — 86334 IMMUNOFIX E-PHORESIS SERUM: CPT | Performed by: PATHOLOGY

## 2024-04-22 PROCEDURE — 83521 IG LIGHT CHAINS FREE EACH: CPT

## 2024-04-22 PROCEDURE — 36415 COLL VENOUS BLD VENIPUNCTURE: CPT

## 2024-04-22 PROCEDURE — 82784 ASSAY IGA/IGD/IGG/IGM EACH: CPT

## 2024-04-23 LAB
ALBUMIN SERPL ELPH-MCNC: 4.4 G/DL (ref 3.7–5.1)
ALPHA1 GLOB SERPL ELPH-MCNC: 0.2 G/DL (ref 0.2–0.4)
ALPHA2 GLOB SERPL ELPH-MCNC: 0.4 G/DL (ref 0.5–0.9)
B-GLOBULIN SERPL ELPH-MCNC: 0.7 G/DL (ref 0.6–1)
GAMMA GLOB SERPL ELPH-MCNC: 1.2 G/DL (ref 0.7–1.6)
IGM SERPL-MCNC: 719 MG/DL (ref 35–242)
KAPPA LC FREE SER-MCNC: 1.69 MG/DL (ref 0.33–1.94)
KAPPA LC FREE/LAMBDA FREE SER NEPH: 1.69 {RATIO} (ref 0.26–1.65)
LAMBDA LC FREE SERPL-MCNC: 1 MG/DL (ref 0.57–2.63)
M PROTEIN SERPL ELPH-MCNC: 0.6 G/DL
PROT PATTERN SERPL ELPH-IMP: ABNORMAL
PROT PATTERN SERPL IFE-IMP: NORMAL
TOTAL PROTEIN SERUM FOR ELP: 7 G/DL (ref 6.4–8.3)

## 2024-04-23 PROCEDURE — 84165 PROTEIN E-PHORESIS SERUM: CPT | Mod: 26 | Performed by: PATHOLOGY

## 2024-04-23 PROCEDURE — 86334 IMMUNOFIX E-PHORESIS SERUM: CPT | Mod: 26 | Performed by: PATHOLOGY

## 2024-04-26 ENCOUNTER — VIRTUAL VISIT (OUTPATIENT)
Dept: ONCOLOGY | Facility: HOSPITAL | Age: 49
End: 2024-04-26
Attending: INTERNAL MEDICINE
Payer: COMMERCIAL

## 2024-04-26 VITALS — WEIGHT: 170 LBS | HEIGHT: 73 IN | BODY MASS INDEX: 22.53 KG/M2

## 2024-04-26 DIAGNOSIS — D47.2 MGUS (MONOCLONAL GAMMOPATHY OF UNKNOWN SIGNIFICANCE): Primary | ICD-10-CM

## 2024-04-26 PROCEDURE — G2211 COMPLEX E/M VISIT ADD ON: HCPCS | Mod: 95 | Performed by: INTERNAL MEDICINE

## 2024-04-26 PROCEDURE — 99214 OFFICE O/P EST MOD 30 MIN: CPT | Mod: 95 | Performed by: INTERNAL MEDICINE

## 2024-04-26 ASSESSMENT — PAIN SCALES - GENERAL: PAINLEVEL: NO PAIN (0)

## 2024-04-26 NOTE — PROGRESS NOTES
Virtual Visit Details    Type of service:  Video Visit   Video Start Time:  2:17 PM  Video End Time: 2:37 PM    Originating Location (pt. Location): Home    Distant Location (provider location):  On-site  Platform used for Video Visit: [facility] Hematology and Oncology Progress Note    Patient: Tip Colin  MRN: [id number]  Date of Service: 05/27/2022        Reason for Visit    Chief Complaint   Patient presents with    RECHECK         Problem List Items Addressed This Visit          Immune    MGUS (monoclonal gammopathy of unknown significance) - Primary    Relevant Orders    CBC with platelets and differential    Protein electrophoresis    Kappa and lambda light chain    Basic metabolic panel  (Ca, Cl, CO2, Creat, Gluc, K, Na, BUN)    IgM           Assessment and Plan  1. IgM kappa MGUS  Bone marrow biopsy done September 2022 showed 5 to 10% plasma cells with slightly skewed kappa to lambda light chain expression ratio on the flow (with a kappa to lambda ratio of 6:1, which is below the threshold of 10: 1).  Flow cytometry showed polytypic plasma cells which also had partial CD20 expression.  No convincing evidence of multiple myeloma.  No evidence of lymphoma.  We decided to pursue observation only.  FISH negative for 11: 14 translocation.    I reviewed his labs from last week.  Serum chemistry is completely within normal limits.  Normal creatinine normal calcium.  CBC is also completely within normal limits.  Hemoglobin was 14.7.  SPEP continues to show presence of IgM kappa monoclonal protein.  His monoclonal peak has slightly increased to 0.6 g/dL.  Kappa lambda light chains are within normal limits with mildly abnormal ratio.  Serum IgM level is stable at 719 mg/dL.  Overall no significant increase in the monoclonal proteins.  Clinically he continues to be asymptomatic.  Again there is no convincing evidence of myeloma or lymphoma at this point in time.  I think we should continue  to watch him closely.  If and when monoclonal peak reaches 1 to 1.5 g then potentially consider repeat bone marrow biopsy and decide on treatment.  At this point in time I do not have any conclusive evidence to suggest starting treatment..    Will repeat labs in 3 months and see him back in 6 months.       Cancer Staging   No matching staging information was found for the patient.      ECOG Performance    0 - Independent         Problem List    Patient Active Problem List   Diagnosis    Nasal congestion    Plantar fasciitis    Acute gouty arthritis    MGUS (monoclonal gammopathy of unknown significance)    Acute pain of right knee    Sialadenitis        Interval History   Tip Colin is a 48 year old with recent diagnosis of IgM kappa MGUS who is seen as a video visit for follow-up.    No doing well since last visit.  Denies any new issues today.  No fever chills or night sweats.  No bone pain.  No weight loss.      Review of Systems  A comprehensive review of systems was negative except for what is noted in the interval history    Current Outpatient Medications   Medication Sig Dispense Refill    Multiple Vitamins-Minerals (CENTRUM ADULTS PO) Take 1 tablet by mouth daily      Vitamin D (Cholecalciferol) 25 MCG (1000 UT) CAPS Take 1 capsule by mouth daily       No current facility-administered medications for this visit.        Physical Exam    Failed to redirect to the Timeline version of the [employer] SmartLink.    General: alert and cooperative     Lab Results    Recent Results (from the past 168 hour(s))   Kappa and lambda light chain   Result Value Ref Range    Kappa Free Light Chains 1.69 0.33 - 1.94 mg/dL    Lambda Free Light Chains 1.00 0.57 - 2.63 mg/dL    Kappa /Lambda Ratio 1.69 (H) 0.26 - 1.65   IgM   Result Value Ref Range    Immunoglobulin M 719 (H) 35 - 242 mg/dL   Comprehensive metabolic panel (BMP + Alb, Alk Phos, ALT, AST, Total. Bili, TP)   Result Value Ref Range    Sodium 142 135 - 145 mmol/L     Potassium 4.3 3.4 - 5.3 mmol/L    Carbon Dioxide (CO2) 29 22 - 29 mmol/L    Anion Gap 8 7 - 15 mmol/L    Urea Nitrogen 10.5 6.0 - 20.0 mg/dL    Creatinine 0.85 0.67 - 1.17 mg/dL    GFR Estimate >90 >60 mL/min/1.73m2    Calcium 9.1 8.6 - 10.0 mg/dL    Chloride 105 98 - 107 mmol/L    Glucose 102 (H) 70 - 99 mg/dL    Alkaline Phosphatase 86 40 - 150 U/L    AST 17 0 - 45 U/L    ALT 18 0 - 70 U/L    Protein Total 7.5 6.4 - 8.3 g/dL    Albumin 4.6 3.5 - 5.2 g/dL    Bilirubin Total 0.9 <=1.2 mg/dL   Protein Immunofixation Serum   Result Value Ref Range    Immunofixation ELP       Monoclonal IgM  immunoglobulin of kappa light chain type.  Pathologic significance requires clinical correlation. Almita Engel MD   Total Protein, Serum for ELP   Result Value Ref Range    Total Protein Serum for ELP 7.0 6.4 - 8.3 g/dL   Protein Electrophoresis, Serum   Result Value Ref Range    Albumin 4.4 3.7 - 5.1 g/dL    Alpha 1 0.2 0.2 - 0.4 g/dL    Alpha 2 0.4 (L) 0.5 - 0.9 g/dL    Beta Globulin 0.7 0.6 - 1.0 g/dL    Gamma Globulin 1.2 0.7 - 1.6 g/dL    Monoclonal Peak 0.6 (H) <=0.0 g/dL    ELP Interpretation       Monoclonal protein (about 0.6 g/dL) seen in the gamma fraction. See immunofixation report on same specimen. Slightly decreased alpha 2 fraction. Pathologic significance requires clinical correlation. Almita Engel M.D.   CBC with platelets and differential   Result Value Ref Range    WBC Count 6.0 4.0 - 11.0 10e3/uL    RBC Count 4.70 4.40 - 5.90 10e6/uL    Hemoglobin 14.7 13.3 - 17.7 g/dL    Hematocrit 42.7 40.0 - 53.0 %    MCV 91 78 - 100 fL    MCH 31.3 26.5 - 33.0 pg    MCHC 34.4 31.5 - 36.5 g/dL    RDW 11.9 10.0 - 15.0 %    Platelet Count 210 150 - 450 10e3/uL    % Neutrophils 65 %    % Lymphocytes 24 %    % Monocytes 8 %    % Eosinophils 2 %    % Basophils 1 %    % Immature Granulocytes 0 %    NRBCs per 100 WBC 0 <1 /100    Absolute Neutrophils 3.9 1.6 - 8.3 10e3/uL    Absolute Lymphocytes 1.5 0.8 - 5.3 10e3/uL     Absolute Monocytes 0.5 0.0 - 1.3 10e3/uL    Absolute Eosinophils 0.1 0.0 - 0.7 10e3/uL    Absolute Basophils 0.0 0.0 - 0.2 10e3/uL    Absolute Immature Granulocytes 0.0 <=0.4 10e3/uL    Absolute NRBCs 0.0 10e3/uL         Imaging    No results found.     A total of 30 min was spent today on this visit which included face to face conversation with the patient, EMR review, counseling and co-ordination of care and medical documentation.    The longitudinal plan of care for the diagnosis(es)/condition(s) as documented were addressed during this visit. Due to the added complexity in care, I will continue to support Tip in the subsequent management and with ongoing continuity of care.        Signed by: Gerry Escobar MD

## 2024-04-26 NOTE — NURSING NOTE
Is the patient currently in the state of [place]? YES    Visit mode:VIDEO    If the visit is dropped, the patient can be reconnected by: VIDEO VISIT: Text to cell phone:   Telephone Information:   Mobile [phone]       Will anyone else be joining the visit? NO  (If patient encounters technical issues they should call [phone]    How would you like to obtain your AVS? MyChart    Are changes needed to the allergy or medication list? No    Are refills needed on medications prescribed by this physician? NO    Reason for visit: KIN CHUNG

## 2024-06-24 ENCOUNTER — OFFICE VISIT (OUTPATIENT)
Dept: OPTOMETRY | Facility: CLINIC | Age: 49
End: 2024-06-24
Payer: COMMERCIAL

## 2024-06-24 DIAGNOSIS — H52.13 MYOPIA WITH PRESBYOPIA OF BOTH EYES: Primary | ICD-10-CM

## 2024-06-24 DIAGNOSIS — H52.4 MYOPIA WITH PRESBYOPIA OF BOTH EYES: Primary | ICD-10-CM

## 2024-06-24 ASSESSMENT — REFRACTION_WEARINGRX
OS_SPHERE: -3.25
OD_CYLINDER: SPHERE
OS_CYLINDER: SPHERE
OD_SPHERE: -3.50

## 2024-06-24 ASSESSMENT — REFRACTION_MANIFEST
OS_SPHERE: -3.00
OD_ADD: +1.00
OS_ADD: +1.00
OS_CYLINDER: SPHERE
OD_SPHERE: -3.25
OD_CYLINDER: SPHERE

## 2024-06-24 ASSESSMENT — VISUAL ACUITY
OS_CC: 20/20
CORRECTION_TYPE: GLASSES
OS_CC+: -2
METHOD: SNELLEN - LINEAR
OD_CC: 20/20

## 2024-06-24 ASSESSMENT — REFRACTION_CURRENTRX
OD_BASECURVE: 8.4
OS_DIAMETER: 14.0
OS_CYLINDER: SPHERE
OD_SPHERE: -3.25
OD_CYLINDER: SPHERE
OS_SPHERE: -3.25
OD_BRAND: ACUVUE OASYS
OS_BASECURVE: 8.4
OS_BRAND: ACUVUE OASYS
OD_DIAMETER: 14.0

## 2024-06-24 ASSESSMENT — CONF VISUAL FIELD
OD_NORMAL: 1
OD_SUPERIOR_NASAL_RESTRICTION: 0
OS_SUPERIOR_NASAL_RESTRICTION: 0
OS_SUPERIOR_TEMPORAL_RESTRICTION: 0
OS_NORMAL: 1
OS_INFERIOR_TEMPORAL_RESTRICTION: 0
OD_INFERIOR_NASAL_RESTRICTION: 0
METHOD: COUNTING FINGERS
OD_INFERIOR_TEMPORAL_RESTRICTION: 0
OD_SUPERIOR_TEMPORAL_RESTRICTION: 0
OS_INFERIOR_NASAL_RESTRICTION: 0

## 2024-06-24 ASSESSMENT — CUP TO DISC RATIO
OS_RATIO: 0.20
OD_RATIO: 0.20

## 2024-06-24 ASSESSMENT — SLIT LAMP EXAM - LIDS
COMMENTS: 1+ BLEPH
COMMENTS: 1+ BLEPH

## 2024-06-24 ASSESSMENT — TONOMETRY
OD_IOP_MMHG: 18
IOP_METHOD: ICARE
OS_IOP_MMHG: 18

## 2024-06-24 NOTE — PROGRESS NOTES
A/P  1.) Myopia/Presbyopia OU  -Spec Rx updated, now symptomatic for near vision. Option for PAL if desired  -Mild CL overwear - stressed CL hygiene with pt. Refit to [employer]. Discussed monthly replacement  -Discussed DVO vs monovision vs multifocal for CL's. He would like to see distance and a bit intermediate, is okay wearing readers prn. Start with [employer] for bump at near and avoid extra minus  -Dilated ocular health unremarkable each eye    Monitor 1-2 years routine, sooner prn    I have confirmed the patient's CC, HPI and reviewed Past Medical History, Past Surgical History, Social History, Family History, Problem List, Medication List and agree with Tech note.     Edith Solo, OD FAAO

## 2024-06-24 NOTE — NURSING NOTE
Chief Complaints and History of Present Illnesses   Patient presents with    Annual Eye Exam     Pt here for new adult annual eye exam.     Chief Complaint(s) and History of Present Illness(es)       Annual Eye Exam              Laterality: both eyes    Comments: Pt here for new adult annual eye exam.              Comments    Pt has largely unchanged vision since last exam. Does have mild issues with near vision. Does not wear contacts anymore.     Rianna Jensen, COT on 6/24/2024 at 10:49 AM

## 2024-07-13 ENCOUNTER — HEALTH MAINTENANCE LETTER (OUTPATIENT)
Age: 49
End: 2024-07-13

## 2024-07-30 ENCOUNTER — LAB (OUTPATIENT)
Dept: INFUSION THERAPY | Facility: HOSPITAL | Age: 49
End: 2024-07-30
Payer: COMMERCIAL

## 2024-07-30 DIAGNOSIS — D47.2 MGUS (MONOCLONAL GAMMOPATHY OF UNKNOWN SIGNIFICANCE): ICD-10-CM

## 2024-07-30 LAB
ANION GAP SERPL CALCULATED.3IONS-SCNC: 13 MMOL/L (ref 7–15)
BASOPHILS # BLD AUTO: 0.1 10E3/UL (ref 0–0.2)
BASOPHILS NFR BLD AUTO: 1 %
BUN SERPL-MCNC: 19.5 MG/DL (ref 6–20)
CALCIUM SERPL-MCNC: 9.2 MG/DL (ref 8.8–10.4)
CHLORIDE SERPL-SCNC: 106 MMOL/L (ref 98–107)
CREAT SERPL-MCNC: 1.01 MG/DL (ref 0.67–1.17)
EGFRCR SERPLBLD CKD-EPI 2021: >90 ML/MIN/1.73M2
EOSINOPHIL # BLD AUTO: 0.1 10E3/UL (ref 0–0.7)
EOSINOPHIL NFR BLD AUTO: 1 %
ERYTHROCYTE [DISTWIDTH] IN BLOOD BY AUTOMATED COUNT: 11.9 % (ref 10–15)
GLUCOSE SERPL-MCNC: 107 MG/DL (ref 70–99)
HCO3 SERPL-SCNC: 27 MMOL/L (ref 22–29)
HCT VFR BLD AUTO: 44.3 % (ref 40–53)
HGB BLD-MCNC: 15.8 G/DL (ref 13.3–17.7)
IMM GRANULOCYTES # BLD: 0 10E3/UL
IMM GRANULOCYTES NFR BLD: 0 %
LYMPHOCYTES # BLD AUTO: 1.6 10E3/UL (ref 0.8–5.3)
LYMPHOCYTES NFR BLD AUTO: 22 %
MCH RBC QN AUTO: 32.5 PG (ref 26.5–33)
MCHC RBC AUTO-ENTMCNC: 35.7 G/DL (ref 31.5–36.5)
MCV RBC AUTO: 91 FL (ref 78–100)
MONOCYTES # BLD AUTO: 0.6 10E3/UL (ref 0–1.3)
MONOCYTES NFR BLD AUTO: 8 %
NEUTROPHILS # BLD AUTO: 5.1 10E3/UL (ref 1.6–8.3)
NEUTROPHILS NFR BLD AUTO: 68 %
NRBC # BLD AUTO: 0 10E3/UL
NRBC BLD AUTO-RTO: 0 /100
PLATELET # BLD AUTO: 218 10E3/UL (ref 150–450)
POTASSIUM SERPL-SCNC: 3.9 MMOL/L (ref 3.4–5.3)
RBC # BLD AUTO: 4.86 10E6/UL (ref 4.4–5.9)
SODIUM SERPL-SCNC: 146 MMOL/L (ref 135–145)
TOTAL PROTEIN SERUM FOR ELP: 7.4 G/DL (ref 6.4–8.3)
WBC # BLD AUTO: 7.4 10E3/UL (ref 4–11)

## 2024-07-30 PROCEDURE — 85048 AUTOMATED LEUKOCYTE COUNT: CPT

## 2024-07-30 PROCEDURE — 84155 ASSAY OF PROTEIN SERUM: CPT

## 2024-07-30 PROCEDURE — 83521 IG LIGHT CHAINS FREE EACH: CPT

## 2024-07-30 PROCEDURE — 84165 PROTEIN E-PHORESIS SERUM: CPT | Mod: TC | Performed by: PATHOLOGY

## 2024-07-30 PROCEDURE — 82784 ASSAY IGA/IGD/IGG/IGM EACH: CPT

## 2024-07-30 PROCEDURE — 80048 BASIC METABOLIC PNL TOTAL CA: CPT

## 2024-07-30 PROCEDURE — 36415 COLL VENOUS BLD VENIPUNCTURE: CPT

## 2024-07-31 LAB
ALBUMIN SERPL ELPH-MCNC: 4.6 G/DL (ref 3.7–5.1)
ALPHA1 GLOB SERPL ELPH-MCNC: 0.2 G/DL (ref 0.2–0.4)
ALPHA2 GLOB SERPL ELPH-MCNC: 0.5 G/DL (ref 0.5–0.9)
B-GLOBULIN SERPL ELPH-MCNC: 0.7 G/DL (ref 0.6–1)
GAMMA GLOB SERPL ELPH-MCNC: 1.3 G/DL (ref 0.7–1.6)
IGM SERPL-MCNC: 834 MG/DL (ref 35–242)
KAPPA LC FREE SER-MCNC: 1.54 MG/DL (ref 0.33–1.94)
KAPPA LC FREE/LAMBDA FREE SER NEPH: 1.48 {RATIO} (ref 0.26–1.65)
LAMBDA LC FREE SERPL-MCNC: 1.04 MG/DL (ref 0.57–2.63)
M PROTEIN SERPL ELPH-MCNC: 0.5 G/DL
PROT PATTERN SERPL ELPH-IMP: ABNORMAL

## 2024-07-31 PROCEDURE — 84165 PROTEIN E-PHORESIS SERUM: CPT | Mod: 26 | Performed by: PATHOLOGY

## 2024-08-07 ENCOUNTER — PATIENT OUTREACH (OUTPATIENT)
Dept: ONCOLOGY | Facility: HOSPITAL | Age: 49
End: 2024-08-07
Payer: COMMERCIAL

## 2024-08-07 NOTE — PROGRESS NOTES
[facility]: Cancer Care                                                                                      RN Cancer Care Coordinator called patient realizing that his RTC today was not needed. Intended to be a 6 month visit, should be in late October. He had blood drawn last week. The labs look stable.     He is happy to have today's visit cancelled.   He will call scheduling at his convenience.   He requests a MyC message to remind him.    Signature:  Gale Borges RN

## 2024-10-02 ENCOUNTER — OFFICE VISIT (OUTPATIENT)
Dept: FAMILY MEDICINE | Facility: CLINIC | Age: 49
End: 2024-10-02
Payer: COMMERCIAL

## 2024-10-02 VITALS
TEMPERATURE: 98.1 F | WEIGHT: 175 LBS | SYSTOLIC BLOOD PRESSURE: 115 MMHG | DIASTOLIC BLOOD PRESSURE: 67 MMHG | HEIGHT: 73 IN | BODY MASS INDEX: 23.19 KG/M2 | OXYGEN SATURATION: 96 % | HEART RATE: 57 BPM | RESPIRATION RATE: 14 BRPM

## 2024-10-02 DIAGNOSIS — G89.29 CHRONIC LEFT SHOULDER PAIN: ICD-10-CM

## 2024-10-02 DIAGNOSIS — G89.29 CHRONIC PAIN OF RIGHT KNEE: Primary | ICD-10-CM

## 2024-10-02 DIAGNOSIS — M25.512 CHRONIC LEFT SHOULDER PAIN: ICD-10-CM

## 2024-10-02 DIAGNOSIS — M72.2 PLANTAR FASCIITIS: ICD-10-CM

## 2024-10-02 DIAGNOSIS — Z12.5 SCREENING FOR PROSTATE CANCER: ICD-10-CM

## 2024-10-02 DIAGNOSIS — M25.561 CHRONIC PAIN OF RIGHT KNEE: Primary | ICD-10-CM

## 2024-10-02 DIAGNOSIS — R73.09 ELEVATED GLUCOSE: ICD-10-CM

## 2024-10-02 ASSESSMENT — ANXIETY QUESTIONNAIRES
2. NOT BEING ABLE TO STOP OR CONTROL WORRYING: NOT AT ALL
5. BEING SO RESTLESS THAT IT IS HARD TO SIT STILL: NOT AT ALL
1. FEELING NERVOUS, ANXIOUS, OR ON EDGE: NOT AT ALL
GAD7 TOTAL SCORE: 1
6. BECOMING EASILY ANNOYED OR IRRITABLE: SEVERAL DAYS
7. FEELING AFRAID AS IF SOMETHING AWFUL MIGHT HAPPEN: NOT AT ALL
7. FEELING AFRAID AS IF SOMETHING AWFUL MIGHT HAPPEN: NOT AT ALL
3. WORRYING TOO MUCH ABOUT DIFFERENT THINGS: NOT AT ALL
GAD7 TOTAL SCORE: 1
IF YOU CHECKED OFF ANY PROBLEMS ON THIS QUESTIONNAIRE, HOW DIFFICULT HAVE THESE PROBLEMS MADE IT FOR YOU TO DO YOUR WORK, TAKE CARE OF THINGS AT HOME, OR GET ALONG WITH OTHER PEOPLE: NOT DIFFICULT AT ALL
GAD7 TOTAL SCORE: 1
8. IF YOU CHECKED OFF ANY PROBLEMS, HOW DIFFICULT HAVE THESE MADE IT FOR YOU TO DO YOUR WORK, TAKE CARE OF THINGS AT HOME, OR GET ALONG WITH OTHER PEOPLE?: NOT DIFFICULT AT ALL
4. TROUBLE RELAXING: NOT AT ALL

## 2024-10-02 ASSESSMENT — PATIENT HEALTH QUESTIONNAIRE - PHQ9
10. IF YOU CHECKED OFF ANY PROBLEMS, HOW DIFFICULT HAVE THESE PROBLEMS MADE IT FOR YOU TO DO YOUR WORK, TAKE CARE OF THINGS AT HOME, OR GET ALONG WITH OTHER PEOPLE: NOT DIFFICULT AT ALL
SUM OF ALL RESPONSES TO PHQ QUESTIONS 1-9: 2
SUM OF ALL RESPONSES TO PHQ QUESTIONS 1-9: 2
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
## 2024-10-02 NOTE — NURSING NOTE
"49 year old  Chief Complaint   Patient presents with    Knee Pain     Right knee pain, started bothering him about 6 months ago.     Shoulder Pain     Left shoulder pain, about 6 months.       Blood pressure 115/67, pulse 57, temperature 98.1  F (36.7  C), temperature source Skin, resp. rate 14, height 1.854 m (6' 1\"), weight 79.4 kg (175 lb), SpO2 96%. Body mass index is 23.09 kg/m .  Patient Active Problem List   Diagnosis    Nasal congestion    Plantar fasciitis    Acute gouty arthritis    MGUS (monoclonal gammopathy of unknown significance)    Acute pain of right knee    Sialadenitis       Wt Readings from Last 2 Encounters:   10/02/24 79.4 kg (175 lb)   04/26/24 77.1 kg (170 lb)     BP Readings from Last 3 Encounters:   10/02/24 115/67   10/02/23 109/66   12/12/22 126/71         Current Outpatient Medications   Medication Sig Dispense Refill    Multiple Vitamins-Minerals (CENTRUM ADULTS PO) Take 1 tablet by mouth daily      Vitamin D (Cholecalciferol) 25 MCG (1000 UT) CAPS Take 1 capsule by mouth daily       No current facility-administered medications for this visit.       Social History     Tobacco Use    Smoking status: Never    Smokeless tobacco: Never   Vaping Use    Vaping status: Never Used   Substance Use Topics    Alcohol use: Yes     Comment: maybe three drinks a week    Drug use: No       Health Maintenance Due   Topic Date Due    ADVANCE CARE PLANNING  Never done    DEPRESSION ACTION PLAN  Never done    HEPATITIS C SCREENING  Never done    HEPATITIS B IMMUNIZATION (1 of 3 - 19+ 3-dose series) Never done    YEARLY PREVENTIVE VISIT  04/20/2023    INFLUENZA VACCINE (1) 09/01/2024    COVID-19 Vaccine (3 - 2024-25 season) 09/01/2024    ZOSTER IMMUNIZATION (1 of 2) 09/07/2024       No results found for: \"PAP\"      October 2, 2024 2:01 PM   "

## 2024-10-03 ENCOUNTER — PATIENT OUTREACH (OUTPATIENT)
Dept: CARE COORDINATION | Facility: CLINIC | Age: 49
End: 2024-10-03
Payer: COMMERCIAL

## 2024-10-07 ENCOUNTER — PATIENT OUTREACH (OUTPATIENT)
Dept: CARE COORDINATION | Facility: CLINIC | Age: 49
End: 2024-10-07
Payer: COMMERCIAL

## 2024-10-21 ENCOUNTER — LAB (OUTPATIENT)
Dept: INFUSION THERAPY | Facility: HOSPITAL | Age: 49
End: 2024-10-21
Attending: INTERNAL MEDICINE
Payer: COMMERCIAL

## 2024-10-21 DIAGNOSIS — D47.2 MGUS (MONOCLONAL GAMMOPATHY OF UNKNOWN SIGNIFICANCE): ICD-10-CM

## 2024-10-21 LAB
ANION GAP SERPL CALCULATED.3IONS-SCNC: 11 MMOL/L (ref 7–15)
BASOPHILS # BLD AUTO: 0 10E3/UL (ref 0–0.2)
BASOPHILS NFR BLD AUTO: 1 %
BUN SERPL-MCNC: 15.5 MG/DL (ref 6–20)
CALCIUM SERPL-MCNC: 9.2 MG/DL (ref 8.8–10.4)
CHLORIDE SERPL-SCNC: 106 MMOL/L (ref 98–107)
CREAT SERPL-MCNC: 0.93 MG/DL (ref 0.67–1.17)
EGFRCR SERPLBLD CKD-EPI 2021: >90 ML/MIN/1.73M2
EOSINOPHIL # BLD AUTO: 0.3 10E3/UL (ref 0–0.7)
EOSINOPHIL NFR BLD AUTO: 4 %
ERYTHROCYTE [DISTWIDTH] IN BLOOD BY AUTOMATED COUNT: 12 % (ref 10–15)
GLUCOSE SERPL-MCNC: 127 MG/DL (ref 70–99)
HCO3 SERPL-SCNC: 27 MMOL/L (ref 22–29)
HCT VFR BLD AUTO: 45.7 % (ref 40–53)
HGB BLD-MCNC: 16.2 G/DL (ref 13.3–17.7)
IMM GRANULOCYTES # BLD: 0 10E3/UL
IMM GRANULOCYTES NFR BLD: 0 %
LYMPHOCYTES # BLD AUTO: 1.7 10E3/UL (ref 0.8–5.3)
LYMPHOCYTES NFR BLD AUTO: 24 %
MCH RBC QN AUTO: 32.2 PG (ref 26.5–33)
MCHC RBC AUTO-ENTMCNC: 35.4 G/DL (ref 31.5–36.5)
MCV RBC AUTO: 91 FL (ref 78–100)
MONOCYTES # BLD AUTO: 0.6 10E3/UL (ref 0–1.3)
MONOCYTES NFR BLD AUTO: 8 %
NEUTROPHILS # BLD AUTO: 4.5 10E3/UL (ref 1.6–8.3)
NEUTROPHILS NFR BLD AUTO: 64 %
NRBC # BLD AUTO: 0 10E3/UL
NRBC BLD AUTO-RTO: 0 /100
PLATELET # BLD AUTO: 244 10E3/UL (ref 150–450)
POTASSIUM SERPL-SCNC: 4.5 MMOL/L (ref 3.4–5.3)
RBC # BLD AUTO: 5.03 10E6/UL (ref 4.4–5.9)
SODIUM SERPL-SCNC: 144 MMOL/L (ref 135–145)
TOTAL PROTEIN SERUM FOR ELP: 7.4 G/DL (ref 6.4–8.3)
WBC # BLD AUTO: 7 10E3/UL (ref 4–11)

## 2024-10-21 PROCEDURE — 85025 COMPLETE CBC W/AUTO DIFF WBC: CPT

## 2024-10-21 PROCEDURE — 84155 ASSAY OF PROTEIN SERUM: CPT

## 2024-10-21 PROCEDURE — 83521 IG LIGHT CHAINS FREE EACH: CPT

## 2024-10-21 PROCEDURE — 36415 COLL VENOUS BLD VENIPUNCTURE: CPT

## 2024-10-21 PROCEDURE — 84165 PROTEIN E-PHORESIS SERUM: CPT | Mod: TC | Performed by: PATHOLOGY

## 2024-10-21 PROCEDURE — 80048 BASIC METABOLIC PNL TOTAL CA: CPT

## 2024-10-21 PROCEDURE — 82784 ASSAY IGA/IGD/IGG/IGM EACH: CPT

## 2024-10-22 LAB
ALBUMIN SERPL ELPH-MCNC: 4.7 G/DL (ref 3.7–5.1)
ALPHA1 GLOB SERPL ELPH-MCNC: 0.2 G/DL (ref 0.2–0.4)
ALPHA2 GLOB SERPL ELPH-MCNC: 0.5 G/DL (ref 0.5–0.9)
B-GLOBULIN SERPL ELPH-MCNC: 0.7 G/DL (ref 0.6–1)
GAMMA GLOB SERPL ELPH-MCNC: 1.3 G/DL (ref 0.7–1.6)
IGM SERPL-MCNC: 847 MG/DL (ref 35–242)
KAPPA LC FREE SER-MCNC: 1.58 MG/DL (ref 0.33–1.94)
KAPPA LC FREE/LAMBDA FREE SER NEPH: 1.61 {RATIO} (ref 0.26–1.65)
LAMBDA LC FREE SERPL-MCNC: 0.98 MG/DL (ref 0.57–2.63)
M PROTEIN SERPL ELPH-MCNC: 0.6 G/DL
PROT PATTERN SERPL ELPH-IMP: ABNORMAL

## 2024-10-22 PROCEDURE — 84165 PROTEIN E-PHORESIS SERUM: CPT | Mod: 26 | Performed by: PATHOLOGY

## 2024-10-31 ENCOUNTER — VIRTUAL VISIT (OUTPATIENT)
Dept: ONCOLOGY | Facility: CLINIC | Age: 49
End: 2024-10-31
Attending: INTERNAL MEDICINE
Payer: COMMERCIAL

## 2024-10-31 VITALS — HEIGHT: 72 IN | WEIGHT: 170 LBS | BODY MASS INDEX: 23.03 KG/M2

## 2024-10-31 DIAGNOSIS — R73.09 ELEVATED GLUCOSE: Primary | ICD-10-CM

## 2024-10-31 DIAGNOSIS — D47.2 MGUS (MONOCLONAL GAMMOPATHY OF UNKNOWN SIGNIFICANCE): ICD-10-CM

## 2024-10-31 PROCEDURE — 99214 OFFICE O/P EST MOD 30 MIN: CPT | Mod: 95 | Performed by: INTERNAL MEDICINE

## 2024-10-31 PROCEDURE — G2211 COMPLEX E/M VISIT ADD ON: HCPCS | Mod: 95 | Performed by: INTERNAL MEDICINE

## 2024-10-31 ASSESSMENT — PAIN SCALES - GENERAL: PAINLEVEL_OUTOF10: NO PAIN (0)

## 2024-10-31 NOTE — PROGRESS NOTES
Virtual Visit Details    Type of service:  Video Visit   Video Start Time:  2:19 PM  Video End Time: 2:33 PM    Originating Location (pt. Location): Home    Distant Location (provider location):  On-site  Platform used for Video Visit: [facility] Hematology and Oncology Progress Note    Patient: Tip Colin  MRN: [id number]  Date of Service: 05/27/2022        Reason for Visit    Chief Complaint   Patient presents with    RECHECK         Problem List Items Addressed This Visit       MGUS (monoclonal gammopathy of unknown significance)     Other Visit Diagnoses       Elevated glucose    -  Primary    Relevant Orders    Hemoglobin A1c                  Assessment and Plan  1. IgM kappa MGUS  Bone marrow biopsy done September 2022 showed 5 to 10% plasma cells with slightly skewed kappa to lambda light chain expression ratio on the flow (with a kappa to lambda ratio of 6:1, which is below the threshold of 10: 1).  Flow cytometry showed polytypic plasma cells which also had partial CD20 expression.  No convincing evidence of multiple myeloma.  No evidence of lymphoma.  We decided to pursue observation only.  FISH negative for 11: 14 translocation.    Had repeat labs done last week.  Monoclonal peak is slightly up at 0.6 g/dL compared to 0.5 last time but it is fluctuating.  Light chains are within normal limits.  IgM levels up at 847 compared to 834 3 months ago so only minimally increased.  Serum chemistry and CBC are completely within normal limits.  So overall no clinical or lab evidence of any significant disease progression.  Besides we do not have a malignancy diagnosis yet since previous bone marrow biopsy did not show any conclusive evidence of plasma cell dyscrasia or a B-cell lymphoma.  Nevertheless since there was a slight skewed towards the kappa predominant plasma cells, we are doing close monitoring.  Will repeat labs again in 3 months and continue close follow-ups.  If monoclonal  peak goes up to a gram or above then I think we should repeat a bone marrow biopsy and consider further workup and management.  At this point in time I do not have any conclusive evidence to say that he has a malignancy or justification to start any kind of treatment.  He does endorse to having some weight loss and some night sweats often.  Not sure if these are just correlated symptoms or is there a causal relationship between these MGUS and the symptoms.  I did offer repeat bone marrow biopsy.  He wants to hold off.    Will repeat labs in 3 months and see him back in 6 months.  Also add on hemoglobin A1c to his neck set of labs since he has had elevated glucose levels in the recent past.       Cancer Staging   No matching staging information was found for the patient.      ECOG Performance    0 - Independent         Problem List    Patient Active Problem List   Diagnosis    Nasal congestion    Plantar fasciitis    Acute gouty arthritis    MGUS (monoclonal gammopathy of unknown significance)    Acute pain of right knee    Sialadenitis        Interval History   Tip Colin is a 49 year old with recent diagnosis of IgM kappa MGUS who is seen as a video visit for follow-up.    Doing relatively well since last visit.  Does complain of some weight loss.  5 to 10 pounds.  Not intentional.  He is eating less.  Also has had some drenching night sweats occasionally.  No enlarging lymph nodes.  Here with repeat labs.      Review of Systems  A comprehensive review of systems was negative except for what is noted in the interval history    Current Outpatient Medications   Medication Sig Dispense Refill    Multiple Vitamins-Minerals (CENTRUM ADULTS PO) Take 1 tablet by mouth daily      Vitamin D (Cholecalciferol) 25 MCG (1000 UT) CAPS Take 1 capsule by mouth daily       No current facility-administered medications for this visit.        Physical Exam    Failed to redirect to the Timeline version of the REVFS  SmartLink.    General: alert and cooperative     Lab Results    No results found for this or any previous visit (from the past week).        Imaging    No results found.     A total of 30 min was spent today on this visit which included face to face conversation with the patient, EMR review, counseling and co-ordination of care and medical documentation.    The longitudinal plan of care for the diagnosis(es)/condition(s) as documented were addressed during this visit. Due to the added complexity in care, I will continue to support Tip in the subsequent management and with ongoing continuity of care.    I reviewed and interpreted each unique lab study personally and independently.      Signed by: Gerry Escobar MD

## 2024-10-31 NOTE — LETTER
10/31/2024      Tip Colin  [address] [place] [address]      Dear Colleague,    Thank you for referring your patient, Tip Colin, to the [facility] [place]. Please see a copy of my visit note below.    Virtual Visit Details    Type of service:  Video Visit   Video Start Time:  2:19 PM  Video End Time: 2:33 PM    Originating Location (pt. Location): Home    Distant Location (provider location):  On-site  Platform used for Video Visit: [facility] Hematology and Oncology Progress Note    Patient: Tip Colin  MRN: [id number]  Date of Service: 05/27/2022        Reason for Visit    Chief Complaint   Patient presents with     RECHECK         Problem List Items Addressed This Visit       MGUS (monoclonal gammopathy of unknown significance)     Other Visit Diagnoses       Elevated glucose    -  Primary    Relevant Orders    Hemoglobin A1c                  Assessment and Plan  1. IgM kappa MGUS  Bone marrow biopsy done September 2022 showed 5 to 10% plasma cells with slightly skewed kappa to lambda light chain expression ratio on the flow (with a kappa to lambda ratio of 6:1, which is below the threshold of 10: 1).  Flow cytometry showed polytypic plasma cells which also had partial CD20 expression.  No convincing evidence of multiple myeloma.  No evidence of lymphoma.  We decided to pursue observation only.  FISH negative for 11: 14 translocation.    Had repeat labs done last week.  Monoclonal peak is slightly up at 0.6 g/dL compared to 0.5 last time but it is fluctuating.  Light chains are within normal limits.  IgM levels up at 847 compared to 834 3 months ago so only minimally increased.  Serum chemistry and CBC are completely within normal limits.  So overall no clinical or lab evidence of any significant disease progression.  Besides we do not have a malignancy diagnosis yet since previous bone marrow biopsy did not show any conclusive evidence of  plasma cell dyscrasia or a B-cell lymphoma.  Nevertheless since there was a slight skewed towards the kappa predominant plasma cells, we are doing close monitoring.  Will repeat labs again in 3 months and continue close follow-ups.  If monoclonal peak goes up to a gram or above then I think we should repeat a bone marrow biopsy and consider further workup and management.  At this point in time I do not have any conclusive evidence to say that he has a malignancy or justification to start any kind of treatment.  He does endorse to having some weight loss and some night sweats often.  Not sure if these are just correlated symptoms or is there a causal relationship between these MGUS and the symptoms.  I did offer repeat bone marrow biopsy.  He wants to hold off.    Will repeat labs in 3 months and see him back in 6 months.  Also add on hemoglobin A1c to his neck set of labs since he has had elevated glucose levels in the recent past.       Cancer Staging   No matching staging information was found for the patient.      ECOG Performance    0 - Independent         Problem List    Patient Active Problem List   Diagnosis     Nasal congestion     Plantar fasciitis     Acute gouty arthritis     MGUS (monoclonal gammopathy of unknown significance)     Acute pain of right knee     Sialadenitis        Interval History   Tip Colin is a 49 year old with recent diagnosis of IgM kappa MGUS who is seen as a video visit for follow-up.    Doing relatively well since last visit.  Does complain of some weight loss.  5 to 10 pounds.  Not intentional.  He is eating less.  Also has had some drenching night sweats occasionally.  No enlarging lymph nodes.  Here with repeat labs.      Review of Systems  A comprehensive review of systems was negative except for what is noted in the interval history    Current Outpatient Medications   Medication Sig Dispense Refill     Multiple Vitamins-Minerals (CENTRUM ADULTS PO) Take 1 tablet by mouth  daily       Vitamin D (Cholecalciferol) 25 MCG (1000 UT) CAPS Take 1 capsule by mouth daily       No current facility-administered medications for this visit.        Physical Exam    Failed to redirect to the Timeline version of the [facility] SmartLink.    General: alert and cooperative     Lab Results    No results found for this or any previous visit (from the past week).        Imaging    No results found.     A total of 30 min was spent today on this visit which included face to face conversation with the patient, EMR review, counseling and co-ordination of care and medical documentation.    The longitudinal plan of care for the diagnosis(es)/condition(s) as documented were addressed during this visit. Due to the added complexity in care, I will continue to support Tip in the subsequent management and with ongoing continuity of care.    I reviewed and interpreted each unique lab study personally and independently.      Signed by: Gerry Escobar MD      Again, thank you for allowing me to participate in the care of your patient.        Sincerely,        Gerry Escobar MD

## 2024-10-31 NOTE — LETTER
10/31/2024      iTp Colin  [address] [place] [address]      Dear Colleague,    Thank you for referring your patient, Tip Colin, to the [facility] [place]. Please see a copy of my visit note below.    Virtual Visit Details    Type of service:  Video Visit   Video Start Time:  2:19 PM  Video End Time: 2:33 PM    Originating Location (pt. Location): Home    Distant Location (provider location):  On-site  Platform used for Video Visit: [facility] Hematology and Oncology Progress Note    Patient: Tip Colin  MRN: [id number]  Date of Service: 05/27/2022        Reason for Visit    Chief Complaint   Patient presents with     RECHECK         Problem List Items Addressed This Visit       MGUS (monoclonal gammopathy of unknown significance)     Other Visit Diagnoses       Elevated glucose    -  Primary    Relevant Orders    Hemoglobin A1c                  Assessment and Plan  1. IgM kappa MGUS  Bone marrow biopsy done September 2022 showed 5 to 10% plasma cells with slightly skewed kappa to lambda light chain expression ratio on the flow (with a kappa to lambda ratio of 6:1, which is below the threshold of 10: 1).  Flow cytometry showed polytypic plasma cells which also had partial CD20 expression.  No convincing evidence of multiple myeloma.  No evidence of lymphoma.  We decided to pursue observation only.  FISH negative for 11: 14 translocation.    Had repeat labs done last week.  Monoclonal peak is slightly up at 0.6 g/dL compared to 0.5 last time but it is fluctuating.  Light chains are within normal limits.  IgM levels up at 847 compared to 834 3 months ago so only minimally increased.  Serum chemistry and CBC are completely within normal limits.  So overall no clinical or lab evidence of any significant disease progression.  Besides we do not have a malignancy diagnosis yet since previous bone marrow biopsy did not show any conclusive evidence of  plasma cell dyscrasia or a B-cell lymphoma.  Nevertheless since there was a slight skewed towards the kappa predominant plasma cells, we are doing close monitoring.  Will repeat labs again in 3 months and continue close follow-ups.  If monoclonal peak goes up to a gram or above then I think we should repeat a bone marrow biopsy and consider further workup and management.  At this point in time I do not have any conclusive evidence to say that he has a malignancy or justification to start any kind of treatment.  He does endorse to having some weight loss and some night sweats often.  Not sure if these are just correlated symptoms or is there a causal relationship between these MGUS and the symptoms.  I did offer repeat bone marrow biopsy.  He wants to hold off.    Will repeat labs in 3 months and see him back in 6 months.  Also add on hemoglobin A1c to his neck set of labs since he has had elevated glucose levels in the recent past.       Cancer Staging   No matching staging information was found for the patient.      ECOG Performance    0 - Independent         Problem List    Patient Active Problem List   Diagnosis     Nasal congestion     Plantar fasciitis     Acute gouty arthritis     MGUS (monoclonal gammopathy of unknown significance)     Acute pain of right knee     Sialadenitis        Interval History   Tip Colin is a 49 year old with recent diagnosis of IgM kappa MGUS who is seen as a video visit for follow-up.    Doing relatively well since last visit.  Does complain of some weight loss.  5 to 10 pounds.  Not intentional.  He is eating less.  Also has had some drenching night sweats occasionally.  No enlarging lymph nodes.  Here with repeat labs.      Review of Systems  A comprehensive review of systems was negative except for what is noted in the interval history    Current Outpatient Medications   Medication Sig Dispense Refill     Multiple Vitamins-Minerals (CENTRUM ADULTS PO) Take 1 tablet by mouth  daily       Vitamin D (Cholecalciferol) 25 MCG (1000 UT) CAPS Take 1 capsule by mouth daily       No current facility-administered medications for this visit.        Physical Exam    Failed to redirect to the Timeline version of the [facility] SmartLink.    General: alert and cooperative     Lab Results    No results found for this or any previous visit (from the past week).        Imaging    No results found.     A total of 30 min was spent today on this visit which included face to face conversation with the patient, EMR review, counseling and co-ordination of care and medical documentation.    The longitudinal plan of care for the diagnosis(es)/condition(s) as documented were addressed during this visit. Due to the added complexity in care, I will continue to support Tip in the subsequent management and with ongoing continuity of care.    I reviewed and interpreted each unique lab study personally and independently.      Signed by: Gerry Escobar MD      Again, thank you for allowing me to participate in the care of your patient.        Sincerely,        Gerry Escobar MD

## 2024-10-31 NOTE — NURSING NOTE
Current patient location: [address]  [place] [address]    Is the patient currently in the state of [place]? YES    Visit mode:VIDEO    If the visit is dropped, the patient can be reconnected by: VIDEO VISIT: Text to cell phone:   Telephone Information:   Mobile [phone]       Will anyone else be joining the visit? NO  (If patient encounters technical issues they should call [phone]    Are changes needed to the allergy or medication list? Pt stated no changes to allergies and Pt stated no med changes    Are refills needed on medications prescribed by this physician? NO    Rooming Documentation:  Questionnaire(s) completed    Reason for visit: KIN [place]UNG

## 2024-10-31 NOTE — PROGRESS NOTES
"Virtual Visit Details    Type of service:  Video Visit   Video Start Time: {video visit start/end time for provider to select:074149}  Video End Time:{video visit start/end time for provider to select:556741}    Originating Location (pt. Location): {video visit patient location:809443::\"Home\"}  {PROVIDER LOCATION On-site should be selected for visits conducted from your clinic location or adjoining [facility] hospital, academic office, or other nearby [facility] building. Off-site should be selected for all other provider locations, including home:182793}  Distant Location (provider location):  {virtual location provider:375902}  Platform used for Video Visit: {Virtual Visit Platforms:845826::\"Delphi\"}    "

## 2024-11-01 ASSESSMENT — ACTIVITIES OF DAILY LIVING (ADL)
RISE FROM A CHAIR: ACTIVITY IS MINIMALLY DIFFICULT
PAIN: I HAVE THE SYMPTOM BUT IT DOES NOT AFFECT MY ACTIVITY
GO UP STAIRS: ACTIVITY IS MINIMALLY DIFFICULT
HOW_WOULD_YOU_RATE_THE_CURRENT_FUNCTION_OF_YOUR_KNEE_DURING_YOUR_USUAL_DAILY_ACTIVITIES_ON_A_SCALE_FROM_0_TO_100_WITH_100_BEING_YOUR_LEVEL_OF_KNEE_FUNCTION_PRIOR_TO_YOUR_INJURY_AND_0_BEING_THE_INABILITY_TO_PERFORM_ANY_OF_YOUR_USUAL_DAILY_ACTIVITIES?: 85
SIT WITH YOUR KNEE BENT: ACTIVITY IS NOT DIFFICULT
HOW_WOULD_YOU_RATE_THE_OVERALL_FUNCTION_OF_YOUR_KNEE_DURING_YOUR_USUAL_DAILY_ACTIVITIES?: NEARLY NORMAL
STIFFNESS: I HAVE THE SYMPTOM BUT IT DOES NOT AFFECT MY ACTIVITY
WALK: ACTIVITY IS MINIMALLY DIFFICULT
PLACING_AN_OBJECT_ON_A_HIGH_SHELF: 0
RISE FROM A CHAIR: ACTIVITY IS MINIMALLY DIFFICULT
AS_A_RESULT_OF_YOUR_KNEE_INJURY,_HOW_WOULD_YOU_RATE_YOUR_CURRENT_LEVEL_OF_DAILY_ACTIVITY?: NEARLY NORMAL
PAIN: I HAVE THE SYMPTOM BUT IT DOES NOT AFFECT MY ACTIVITY
KNEEL ON THE FRONT OF YOUR KNEE: ACTIVITY IS SOMEWHAT DIFFICULT
GO DOWN STAIRS: ACTIVITY IS MINIMALLY DIFFICULT
STIFFNESS: I HAVE THE SYMPTOM BUT IT DOES NOT AFFECT MY ACTIVITY
GIVING WAY, BUCKLING OR SHIFTING OF KNEE: I HAVE THE SYMPTOM BUT IT DOES NOT AFFECT MY ACTIVITY
AS_A_RESULT_OF_YOUR_KNEE_INJURY,_HOW_WOULD_YOU_RATE_YOUR_CURRENT_LEVEL_OF_DAILY_ACTIVITY?: NEARLY NORMAL
AT_ITS_WORST?: 1
WASHING_YOUR_BACK: 0
KNEE_ACTIVITY_OF_DAILY_LIVING_SCORE: 78.57
SQUAT: ACTIVITY IS SOMEWHAT DIFFICULT
SQUAT: ACTIVITY IS SOMEWHAT DIFFICULT
PUTTING_ON_AN_UNDERSHIRT_OR_A_PULLOVER_SWEATER: 0
GO DOWN STAIRS: ACTIVITY IS MINIMALLY DIFFICULT
KNEE_ACTIVITY_OF_DAILY_LIVING_SUM: 55
SIT WITH YOUR KNEE BENT: ACTIVITY IS NOT DIFFICULT
PUSHING_WITH_THE_INVOLVED_ARM: 0
PLEASE_INDICATE_YOR_PRIMARY_REASON_FOR_REFERRAL_TO_THERAPY:: SHOULDER
STAND: ACTIVITY IS MINIMALLY DIFFICULT
WEAKNESS: THE SYMPTOM AFFECTS MY ACTIVITY SLIGHTLY
WEAKNESS: THE SYMPTOM AFFECTS MY ACTIVITY SLIGHTLY
WASHING_YOUR_HAIR?: 0
SWELLING: I DO NOT HAVE THE SYMPTOM
HOW_WOULD_YOU_RATE_THE_CURRENT_FUNCTION_OF_YOUR_KNEE_DURING_YOUR_USUAL_DAILY_ACTIVITIES_ON_A_SCALE_FROM_0_TO_100_WITH_100_BEING_YOUR_LEVEL_OF_KNEE_FUNCTION_PRIOR_TO_YOUR_INJURY_AND_0_BEING_THE_INABILITY_TO_PERFORM_ANY_OF_YOUR_USUAL_DAILY_ACTIVITIES?: 85
WALK: ACTIVITY IS MINIMALLY DIFFICULT
CARRYING_A_HEAVY_OBJECT_OF_10_POUNDS: 0
TOUCHING_THE_BACK_OF_YOUR_NECK: 0
STAND: ACTIVITY IS MINIMALLY DIFFICULT
PUTTING_ON_YOUR_PANTS: 0
KNEEL ON THE FRONT OF YOUR KNEE: ACTIVITY IS SOMEWHAT DIFFICULT
LIMPING: I HAVE THE SYMPTOM BUT IT DOES NOT AFFECT MY ACTIVITY
RAW_SCORE: 55
REACHING_FOR_SOMETHING_ON_A_HIGH_SHELF: 2
LIMPING: I HAVE THE SYMPTOM BUT IT DOES NOT AFFECT MY ACTIVITY
WHEN_LYING_ON_THE_INVOLVED_SIDE: 0
SWELLING: I DO NOT HAVE THE SYMPTOM
GIVING WAY, BUCKLING OR SHIFTING OF KNEE: I HAVE THE SYMPTOM BUT IT DOES NOT AFFECT MY ACTIVITY
HOW_WOULD_YOU_RATE_THE_OVERALL_FUNCTION_OF_YOUR_KNEE_DURING_YOUR_USUAL_DAILY_ACTIVITIES?: NEARLY NORMAL
REMOVING_SOMETHING_FROM_YOUR_BACK_POCKET: 0
GO UP STAIRS: ACTIVITY IS MINIMALLY DIFFICULT
PLEASE_INDICATE_YOR_PRIMARY_REASON_FOR_REFERRAL_TO_THERAPY:: KNEE
PUTTING_ON_A_SHIRT_THAT_BUTTONS_DOWN_THE_FRONT: 0

## 2024-11-04 ENCOUNTER — THERAPY VISIT (OUTPATIENT)
Dept: PHYSICAL THERAPY | Facility: CLINIC | Age: 49
End: 2024-11-04
Attending: INTERNAL MEDICINE
Payer: COMMERCIAL

## 2024-11-04 DIAGNOSIS — G89.29 CHRONIC LEFT SHOULDER PAIN: ICD-10-CM

## 2024-11-04 DIAGNOSIS — M25.561 CHRONIC PAIN OF RIGHT KNEE: ICD-10-CM

## 2024-11-04 DIAGNOSIS — M25.512 CHRONIC LEFT SHOULDER PAIN: ICD-10-CM

## 2024-11-04 DIAGNOSIS — G89.29 CHRONIC PAIN OF RIGHT KNEE: ICD-10-CM

## 2024-11-04 PROCEDURE — 97110 THERAPEUTIC EXERCISES: CPT | Mod: GP

## 2024-11-04 PROCEDURE — 97161 PT EVAL LOW COMPLEX 20 MIN: CPT | Mod: GP

## 2024-11-04 NOTE — PROGRESS NOTES
PHYSICAL THERAPY EVALUATION  Type of Visit: Evaluation       Fall Risk Screen:  Fall screen completed by: PT  Have you fallen 2 or more times in the past year?: (Patient-Rptd) No  Have you fallen and had an injury in the past year?: (Patient-Rptd) No  Is patient a fall risk?: No    Subjective         Presenting condition or subjective complaint: (Patient-Rptd) Knee pain. Weakness. Clicking locking. Shoulder pain. Decreased range of movement.    Patient presents with right knee pain and left shoulder pain. Shoulder feels like it has gotten mostly better. His knee causes him to be apprehensive, it feels a little weaker. His knee has been bothering him for 4 or so months. NKI. Pain was bad enough it has caused him to limp, however it has improved. No regular exercise routine, but stands and does a lot of stairs all day at work.     Date of onset: 10/02/24 (MD referral date)    Relevant medical history:     Dates & types of surgery:      Prior diagnostic imaging/testing results: (Patient-Rptd) MRI     Prior therapy history for the same diagnosis, illness or injury: (Patient-Rptd) No      Prior Level of Function  Transfers: Independent  Ambulation: Independent  ADL: Independent  IADL:  Independent    Living Environment  Social support: (Patient-Rptd) With a significant other or spouse   Type of home: (Patient-Rptd) Apartment/condo   Stairs to enter the home: (Patient-Rptd) Yes (Patient-Rptd) 50 Is there a railing: (Patient-Rptd) Yes     Ramp: (Patient-Rptd) Yes   Stairs inside the home: (Patient-Rptd) No       Help at home: (Patient-Rptd) None  Equipment owned: (Patient-Rptd) Straight Cane     Employment: (Patient-Rptd) Yes (Patient-Rptd) . .  Hobbies/Interests: (Patient-Rptd) Skateboarding. Collecting marbles. Trying to levitate. Code breaking. ng    Patient goals for therapy: (Patient-Rptd) Keep up with young child. Read minds. Do taxes.    Pain assessment: See objective evaluation for additional pain  details     Objective   KEY FINDINGS:  Full and pain free knee ROM  Bilateral hamstring tightness  Good LE strength    KNEE EVALUATION  PAIN: Pain Level at Best: 0/10  Pain Level at Worst: 4/10  Pain Location: posterolateral knee  Pain Quality: Aching and Dull  Other symptoms: clicking, weakness  Pain Frequency: intermittent  Time dependent: no  Pain is Exacerbated By: kneeling and sitting back on heels  Pain is Relieved By: hasn't tried many things  Pain Progression: Improved  INTEGUMENTARY (edema, incisions): Sweep Test: 0  POSTURE:   GAIT:  Weightbearing Status:   Assistive Device(s):   Gait Deviations:   BALANCE/PROPRIOCEPTION:   WEIGHTBEARING ALIGNMENT:  Mild varus noted  NON-WEIGHTBEARING ALIGNMENT:   ROM:   Lower Extremity ROM   Left (PROM) Left (AROM) Right (PROM) Right (AROM)   Hip Flexion WNL  WNL    Hip Extension       Hip ER WNL  WNL    Hip IR WNL  WNL    Hip ABD       Hip ADD       Knee Hyperextension  5  5   Knee Extension  0  0   Knee Flexion  ~130  ~130   *no pain OP into knee flexion/extension    STRENGTH:    Left Right   Hip Flex     Hip Ext 5- 5-   Hip ER     Hip IR     Hip ABD 5 5   Hip ADD     Knee Ext 5 5   Knee Flex 5 5   Quad Set     *indicates pain    FLEXIBILITY: quads WNL, bilateral hamstring tightness    SPECIAL TESTS:    Left Right   Yolis's (Meniscus)  Negative    Zhen's (ITB/TFL)  Negative    Patellar Apprehension Test     Patella Tracking     Ligamentous Stability     Anterior Drawer (ACL)     Posterior Drawer (PCL)     Prone Dial Test at 30 Deg and 90 Deg (PCL/PLC)     Valgus Stress Testing at 0 Deg and 30 Deg  Negative,     Varus Stress Testing at 0 Deg and 30 Deg   Negative,       FUNCTIONAL TESTS:   Double Leg Squat: Anterior knee translation and Improper use of glutes/hips    PALPATION: no TTP    JOINT MOBILITY: WNL      Assessment & Plan   CLINICAL IMPRESSIONS  Medical Diagnosis: Chronic pain of right knee, Chronic left shoulder pain    Treatment Diagnosis: Chronic pain of  right knee, Chronic left shoulder pain   Impression/Assessment: Patient is a 49 year old male with right knee and left shoulder complaints.  The following significant findings have been identified: Pain, Decreased ROM/flexibility, Decreased strength, Impaired balance, and Decreased activity tolerance. These impairments interfere with their ability to perform self care tasks, work tasks, recreational activities, household mobility, and community mobility as compared to previous level of function.     Clinical Decision Making (Complexity):  Clinical Presentation: Stable/Uncomplicated  Clinical Presentation Rationale: based on medical and personal factors listed in PT evaluation  Clinical Decision Making (Complexity): Low complexity    PLAN OF CARE  Treatment Interventions:  Modalities: Dry Needling  Interventions: Gait Training, Manual Therapy, Neuromuscular Re-education, Therapeutic Activity, Therapeutic Exercise, Self-Care/Home Management    Long Term Goals     PT Goal 1  Goal Identifier: Kneeling  Goal Description: Patient will be able to kneel without increased knee pain  Rationale: to maximize safety and independence with performance of ADLs and functional tasks;to maximize safety and independence within the home (playing with his child)  Target Date: 01/27/25  PT Goal 2  Goal Identifier: Reaching  Goal Description: Patient will be able to reach unrestricted without increased pain  Rationale: to maximize safety and independence with performance of ADLs and functional tasks;to maximize safety and independence within the home;to maximize safety and independence with self cares  Target Date: 01/27/25      Frequency of Treatment: 1x/week  Duration of Treatment: 4 weeks tapering to 2x/month for 8 weeks    Recommended Referrals to Other Professionals:  none at this time  Education Assessment:   Learner/Method: Patient;Demonstration;Pictures/Video;No Barriers to Learning    Risks and benefits of evaluation/treatment  have been explained.   Patient/Family/caregiver agrees with Plan of Care.     Evaluation Time:     PT Eval, Low Complexity Minutes (14729): 25       Signing Clinician: Janice Palencia PT        [facility]                                                                                   OUTPATIENT PHYSICAL THERAPY      PLAN OF TREATMENT FOR OUTPATIENT REHABILITATION   Patient's Last Name, First Name, Tip Jordan YOB: 1975   Provider's Name   [facility]   Medical Record No.  [id number]     Onset Date: 10/02/24 (MD referral date)  Start of Care Date: 11/04/24     Medical Diagnosis:  Chronic pain of right knee, Chronic left shoulder pain      PT Treatment Diagnosis:  Chronic pain of right knee, Chronic left shoulder pain Plan of Treatment  Frequency/Duration: 1x/week/ 4 weeks tapering to 2x/month for 8 weeks    Certification date from 11/04/24 to 01/27/25         See note for plan of treatment details and functional goals     Janice Palencia PT                         I CERTIFY THE NEED FOR THESE SERVICES FURNISHED UNDER        THIS PLAN OF TREATMENT AND WHILE UNDER MY CARE     (Physician attestation of this document indicates review and certification of the therapy plan).              Referring Provider:  Fatuma Houston    Initial Assessment  See Epic Evaluation- Start of Care Date: 11/04/24

## 2024-12-02 ENCOUNTER — THERAPY VISIT (OUTPATIENT)
Dept: PHYSICAL THERAPY | Facility: CLINIC | Age: 49
End: 2024-12-02
Payer: COMMERCIAL

## 2024-12-02 DIAGNOSIS — M25.561 CHRONIC PAIN OF RIGHT KNEE: Primary | ICD-10-CM

## 2024-12-02 DIAGNOSIS — G89.29 CHRONIC PAIN OF RIGHT KNEE: Primary | ICD-10-CM

## 2024-12-02 DIAGNOSIS — G89.29 CHRONIC LEFT SHOULDER PAIN: ICD-10-CM

## 2024-12-02 DIAGNOSIS — M25.512 CHRONIC LEFT SHOULDER PAIN: ICD-10-CM

## 2024-12-02 PROCEDURE — 97110 THERAPEUTIC EXERCISES: CPT | Mod: GP

## 2024-12-02 NOTE — PROGRESS NOTES
12/02/24 0500   Appointment Info   Signing clinician's name / credentials Janice Palencia DPT   Total/Authorized Visits E&T 8 POC   Visits Used 2   Medical Diagnosis Chronic pain of right knee, Chronic left shoulder pain   PT Tx Diagnosis Chronic pain of right knee, Chronic left shoulder pain   Progress Note/Certification   Start of Care Date 11/04/24   Onset of illness/injury or Date of Surgery 10/02/24  (MD referral date)   Therapy Frequency 1x/week   Predicted Duration 4 weeks tapering to 2x/month for 8 weeks   Certification date from 11/04/24   Certification date to 01/27/25   Progress Note Due Date 01/03/25   Progress Note Completed Date 11/04/24   GOALS   PT Goals 2   PT Goal 1   Goal Identifier Kneeling   Goal Description Patient will be able to kneel without increased knee pain   Rationale to maximize safety and independence with performance of ADLs and functional tasks;to maximize safety and independence within the home  (playing with his child)   Target Date 01/27/25   PT Goal 2   Goal Identifier Reaching   Goal Description Patient will be able to reach unrestricted without increased pain   Rationale to maximize safety and independence with performance of ADLs and functional tasks;to maximize safety and independence within the home;to maximize safety and independence with self cares   Target Date 01/27/25   Subjective Report   Subjective Report Overall feels a decrease in pain and pressure in his knee. Feels that his shoes, his work floor and his diet all impact symptoms. Admits he wasn't the best at exericses, but did the hamstring stretch and lateral step down somewhat regularly. He also has R heel pain that has been going on for about the same amount of time, its sharper pain. Notices this at work with extended standing. Wears inserts. Shoulder continues to feel pretty good. Notices his knee most days at work with extended standing. Doesn't necessarily worsen as the day goes on, just nags.  "  Objective Measures   Objective Measures Objective Measure 1   Objective Measure 1   Details lumbar screen clear, ROM WNL and no pain and radiating pain.   Treatment Interventions (PT)   Interventions Therapeutic Procedure/Exercise   Therapeutic Procedure/Exercise   Therapeutic Procedures: strength, endurance, ROM, flexibility minutes (77363) 40   PTRx Ther Proc 1 Seated Hamstring Stretch   PTRx Ther Proc 1 - Details 2x30 sec holds for review   PTRx Ther Proc 2 Single Leg Bridge   PTRx Ther Proc 2 - Details 10x B, need review of form   PTRx Ther Proc 3 Lateral Step Down   PTRx Ther Proc 3 - Details 10x 6\", improved control, no pain   PTRx Ther Proc 4 Squat   PTRx Ther Proc 4 - Details 12x, good form, had forgotten these   Skilled Intervention appropriate adjustment of HEP based on patient presentation   Ther Proc 1 trialed banded hamstring curls, patient prefers other exercises and wanted to keep HEP manageable, did not add.   PTRx Ther Proc 5 Hamstring Reach   PTRx Ther Proc 5 - Details 10x B, challenging, but no pain   PTRx Ther Proc 6 Toe Raises   PTRx Ther Proc 6 - Details 10x for heel pain   PTRx Ther Proc 7 Standing Gastroc Stretch   PTRx Ther Proc 7 - Details 30 sec holds for heel pain   Education   Learner/Method Patient;Demonstration;Pictures/Video;No Barriers to Learning   Plan   Home program printed   Updates to plan of care progressed   Plan for next session reassess/possible discharge   Total Session Time   Timed Code Treatment Minutes 40   Total Treatment Time (sum of timed and untimed services) 40         PLAN  Continue therapy per current plan of care.    Beginning/End Dates of Progress Note Reporting Period:  11/04/24 to 12/02/2024    Referring Provider:  Fatuma Houston    "

## 2024-12-03 ENCOUNTER — TELEPHONE (OUTPATIENT)
Dept: ONCOLOGY | Facility: HOSPITAL | Age: 49
End: 2024-12-03
Payer: COMMERCIAL

## 2024-12-09 ENCOUNTER — OFFICE VISIT (OUTPATIENT)
Dept: FAMILY MEDICINE | Facility: CLINIC | Age: 49
End: 2024-12-09
Payer: COMMERCIAL

## 2024-12-09 VITALS
OXYGEN SATURATION: 95 % | TEMPERATURE: 97.3 F | RESPIRATION RATE: 15 BRPM | HEART RATE: 58 BPM | DIASTOLIC BLOOD PRESSURE: 69 MMHG | SYSTOLIC BLOOD PRESSURE: 109 MMHG

## 2024-12-09 DIAGNOSIS — K30 INDIGESTION: Primary | ICD-10-CM

## 2024-12-09 ASSESSMENT — ANXIETY QUESTIONNAIRES
7. FEELING AFRAID AS IF SOMETHING AWFUL MIGHT HAPPEN: NOT AT ALL
2. NOT BEING ABLE TO STOP OR CONTROL WORRYING: NOT AT ALL
GAD7 TOTAL SCORE: 0
4. TROUBLE RELAXING: NOT AT ALL
7. FEELING AFRAID AS IF SOMETHING AWFUL MIGHT HAPPEN: NOT AT ALL
5. BEING SO RESTLESS THAT IT IS HARD TO SIT STILL: NOT AT ALL
GAD7 TOTAL SCORE: 0
6. BECOMING EASILY ANNOYED OR IRRITABLE: NOT AT ALL
3. WORRYING TOO MUCH ABOUT DIFFERENT THINGS: NOT AT ALL
IF YOU CHECKED OFF ANY PROBLEMS ON THIS QUESTIONNAIRE, HOW DIFFICULT HAVE THESE PROBLEMS MADE IT FOR YOU TO DO YOUR WORK, TAKE CARE OF THINGS AT HOME, OR GET ALONG WITH OTHER PEOPLE: NOT DIFFICULT AT ALL
8. IF YOU CHECKED OFF ANY PROBLEMS, HOW DIFFICULT HAVE THESE MADE IT FOR YOU TO DO YOUR WORK, TAKE CARE OF THINGS AT HOME, OR GET ALONG WITH OTHER PEOPLE?: NOT DIFFICULT AT ALL
1. FEELING NERVOUS, ANXIOUS, OR ON EDGE: NOT AT ALL
GAD7 TOTAL SCORE: 0

## 2024-12-09 ASSESSMENT — PATIENT HEALTH QUESTIONNAIRE - PHQ9
SUM OF ALL RESPONSES TO PHQ QUESTIONS 1-9: 1
SUM OF ALL RESPONSES TO PHQ QUESTIONS 1-9: 1

## 2024-12-09 ASSESSMENT — PAIN SCALES - GENERAL: PAINLEVEL_OUTOF10: NO PAIN (0)

## 2024-12-09 NOTE — PATIENT INSTRUCTIONS
Limit spicy, acidic foods  Carbonated beverages  Tylenol only---doesn't bother the stomach  Limit caffeine, alcohol    Omeprazole daily x 2 wks  TUMs     Wait 3 hr after eating to lie down      Red flags--contact me if these occur  Trouble swallowing  Black vomit  Escalating pain

## 2024-12-09 NOTE — PROGRESS NOTES
[facility] [address], [address]  [facility] [address]  Phone: [phone]  Fax: [phone]    Patient:  Tip Colin 1975  Date of Visit:  8:16 AM  Referring Provider Referred Self      Assessment & Plan    (K30) Indigestion  (primary encounter diagnosis)  Comment: 1 wk history of upper abdominal pain, fullness after eating, occasional vomiting, may have been viral illness, now improving  Plan: omeprazole (PRILOSEC) 20 MG DR capsule        Recommend omeprazole daily x 2 wks then taper as tolerated. Cut back on spicy, acidic foods,  notify MD if not improving.       Fatuma Houston MD     Tip Colin is a 49 year old male with hx of MGUS, Gouty arthritis, sialadenitis, nasal congestion and history of depression. He is here to discuss:     Indigestion  1 wk history of upper GI symptoms  Began after eating pizza, then chocolates, shortly thereafter, vomited  Following day, ate manicotti for dinner 7 pm, shortly after going to bed, pressure at upper belly, vomited again. Then felt better  Following day, ate slice of pizza, then felt ill, nearly vomiting, symptoms passed  After that symptoms resolved  Changed diet to include soups  Took TUMs, with some relief  No dysphagia  No hx of ulcers, denies acid reflux    Habits   Caffeine: 1 / day  Etoh: 1/ day  Ibuprofen -- none    Patient Active Problem List   Diagnosis    Nasal congestion    Plantar fasciitis    Acute gouty arthritis    MGUS (monoclonal gammopathy of unknown significance)    Acute pain of right knee    Sialadenitis    Chronic pain of right knee    Chronic left shoulder pain       Current Outpatient Medications   Medication Sig Dispense Refill    Multiple Vitamins-Minerals (CENTRUM ADULTS PO) Take 1 tablet by mouth daily      Vitamin D (Cholecalciferol) 25 MCG (1000 UT) CAPS Take 1 capsule by mouth daily         No Known Allergies     EXAM  /69 (BP Location: Right arm, Patient  Position: Sitting, Cuff Size: Adult Regular)   Pulse 58   Temp 97.3  F (36.3  C) (Skin)   Resp 15   SpO2 95%   Gen: Alert, pleasant, NAD  COR: S1,S2, no murmur  Lungs: CTA bilaterally, no rhonchi, wheezes or rales  Abdomen: soft, midepigastric tenderness, normal bowel sounds, no HSM     Answers submitted by the patient for this visit:  Patient Health Questionnaire (Submitted on 12/9/2024)  PHQ9 TOTAL SCORE: 1  Patient Health Questionnaire (G7) (Submitted on 12/9/2024)  ANAIS 7 TOTAL SCORE: 0

## 2024-12-09 NOTE — NURSING NOTE
"49 year old    Chief Complaint   Patient presents with    Gastrophageal Reflux     Had indigestion last week but hasn't had it since, is looking to discuss what he can do/what he should avoid         Blood pressure 109/69, pulse 58, temperature 97.3  F (36.3  C), temperature source Skin, resp. rate 15, SpO2 95%. There is no height or weight on file to calculate BMI.    Patient Active Problem List   Diagnosis    Nasal congestion    Plantar fasciitis    Acute gouty arthritis    MGUS (monoclonal gammopathy of unknown significance)    Acute pain of right knee    Sialadenitis    Chronic pain of right knee    Chronic left shoulder pain          Wt Readings from Last 2 Encounters:   10/31/24 77.1 kg (170 lb)   10/02/24 79.4 kg (175 lb)       BP Readings from Last 3 Encounters:   12/09/24 109/69   10/02/24 115/67   10/02/23 109/66             Current Outpatient Medications   Medication Sig Dispense Refill    Multiple Vitamins-Minerals (CENTRUM ADULTS PO) Take 1 tablet by mouth daily      Vitamin D (Cholecalciferol) 25 MCG (1000 UT) CAPS Take 1 capsule by mouth daily       No current facility-administered medications for this visit.          Social History     Tobacco Use    Smoking status: Never    Smokeless tobacco: Never   Vaping Use    Vaping status: Never Used   Substance Use Topics    Alcohol use: Yes     Comment: maybe three drinks a week    Drug use: No          Health Maintenance Due   Topic Date Due    ADVANCE CARE PLANNING  Never done    DEPRESSION ACTION PLAN  Never done    HEPATITIS C SCREENING  Never done    HEPATITIS B IMMUNIZATION (1 of 3 - 19+ 3-dose series) Never done    YEARLY PREVENTIVE VISIT  04/20/2023    INFLUENZA VACCINE (1) 09/01/2024    COVID-19 Vaccine (3 - 2024-25 season) 09/01/2024    ZOSTER IMMUNIZATION (1 of 2) 09/07/2024        No results found for: \"PAP\"        December 9, 2024 3:18 PM        "

## 2025-02-06 ENCOUNTER — PATIENT OUTREACH (OUTPATIENT)
Dept: ONCOLOGY | Facility: HOSPITAL | Age: 50
End: 2025-02-06

## 2025-02-06 NOTE — PROGRESS NOTES
[facility]: Cancer Care                                                                                          Unable To Contact    Clinical Data: RN Care Coordinator Outreach    Outreach attempted x 1.  Left message on patient's voicemail with call back information and requested return call.    Plan: RN Care Coordinator will try to reach patient again in 3-5 business days.    Patient was last seen in October.  Doctors checkout note indicates that he needed to get labs every 3 months.  And return to clinic in 6 months with labs a week prior.      Signature:  Brittni Gutierrez RN Care Coordinator  [facility]  Cancer [facility]

## 2025-02-11 ENCOUNTER — PATIENT OUTREACH (OUTPATIENT)
Dept: ONCOLOGY | Facility: HOSPITAL | Age: 50
End: 2025-02-11

## 2025-02-11 NOTE — PROGRESS NOTES
[facility]: Cancer Care                                                                                      Sent outreach MyC message welcoming patient to reach out to our clinic. He has been called/messaged 3 times in the last 3 months with no response.     Let him know I will take him off the call list.     Signature:  Gale Borges RN

## 2025-07-19 ENCOUNTER — HEALTH MAINTENANCE LETTER (OUTPATIENT)
Age: 50
End: 2025-07-19

## 2025-07-28 ENCOUNTER — LAB (OUTPATIENT)
Dept: INFUSION THERAPY | Facility: HOSPITAL | Age: 50
End: 2025-07-28
Attending: INTERNAL MEDICINE
Payer: MEDICAID

## 2025-07-28 DIAGNOSIS — D47.2 MGUS (MONOCLONAL GAMMOPATHY OF UNKNOWN SIGNIFICANCE): ICD-10-CM

## 2025-07-28 LAB
ANION GAP SERPL CALCULATED.3IONS-SCNC: 9 MMOL/L (ref 7–15)
BASOPHILS # BLD AUTO: 0 10E3/UL (ref 0–0.2)
BASOPHILS NFR BLD AUTO: 1 %
BUN SERPL-MCNC: 14.4 MG/DL (ref 6–20)
CALCIUM SERPL-MCNC: 9 MG/DL (ref 8.8–10.4)
CHLORIDE SERPL-SCNC: 110 MMOL/L (ref 98–107)
CREAT SERPL-MCNC: 0.86 MG/DL (ref 0.67–1.17)
EGFRCR SERPLBLD CKD-EPI 2021: >90 ML/MIN/1.73M2
EOSINOPHIL # BLD AUTO: 0.1 10E3/UL (ref 0–0.7)
EOSINOPHIL NFR BLD AUTO: 2 %
ERYTHROCYTE [DISTWIDTH] IN BLOOD BY AUTOMATED COUNT: 11.7 % (ref 10–15)
GLUCOSE SERPL-MCNC: 103 MG/DL (ref 70–99)
HCO3 SERPL-SCNC: 29 MMOL/L (ref 22–29)
HCT VFR BLD AUTO: 41.9 % (ref 40–53)
HGB BLD-MCNC: 14.4 G/DL (ref 13.3–17.7)
IMM GRANULOCYTES # BLD: 0 10E3/UL
IMM GRANULOCYTES NFR BLD: 0 %
LYMPHOCYTES # BLD AUTO: 1.8 10E3/UL (ref 0.8–5.3)
LYMPHOCYTES NFR BLD AUTO: 24 %
MCH RBC QN AUTO: 31.6 PG (ref 26.5–33)
MCHC RBC AUTO-ENTMCNC: 34.4 G/DL (ref 31.5–36.5)
MCV RBC AUTO: 92 FL (ref 78–100)
MONOCYTES # BLD AUTO: 0.5 10E3/UL (ref 0–1.3)
MONOCYTES NFR BLD AUTO: 8 %
NEUTROPHILS # BLD AUTO: 4.7 10E3/UL (ref 1.6–8.3)
NEUTROPHILS NFR BLD AUTO: 65 %
NRBC # BLD AUTO: 0 10E3/UL
NRBC BLD AUTO-RTO: 0 /100
PLATELET # BLD AUTO: 208 10E3/UL (ref 150–450)
POTASSIUM SERPL-SCNC: 4.3 MMOL/L (ref 3.4–5.3)
PROT SERPL-MCNC: 6.6 G/DL (ref 6.4–8.3)
RBC # BLD AUTO: 4.56 10E6/UL (ref 4.4–5.9)
SODIUM SERPL-SCNC: 148 MMOL/L (ref 135–145)
WBC # BLD AUTO: 7.2 10E3/UL (ref 4–11)

## 2025-07-28 PROCEDURE — 83521 IG LIGHT CHAINS FREE EACH: CPT | Mod: XU

## 2025-07-28 PROCEDURE — 84165 PROTEIN E-PHORESIS SERUM: CPT | Mod: TC | Performed by: PATHOLOGY

## 2025-07-28 PROCEDURE — 85004 AUTOMATED DIFF WBC COUNT: CPT

## 2025-07-28 PROCEDURE — 80048 BASIC METABOLIC PNL TOTAL CA: CPT

## 2025-07-28 PROCEDURE — 36415 COLL VENOUS BLD VENIPUNCTURE: CPT

## 2025-07-28 PROCEDURE — 82784 ASSAY IGA/IGD/IGG/IGM EACH: CPT

## 2025-07-28 PROCEDURE — 84155 ASSAY OF PROTEIN SERUM: CPT

## 2025-07-29 LAB
ALBUMIN SERPL ELPH-MCNC: 4 G/DL (ref 3.7–5.1)
ALPHA1 GLOB SERPL ELPH-MCNC: 0.2 G/DL (ref 0.2–0.4)
ALPHA2 GLOB SERPL ELPH-MCNC: 0.5 G/DL (ref 0.5–0.9)
B-GLOBULIN SERPL ELPH-MCNC: 0.6 G/DL (ref 0.6–1)
GAMMA GLOB SERPL ELPH-MCNC: 1.2 G/DL (ref 0.7–1.6)
IGM SERPL-MCNC: 798 MG/DL (ref 35–242)
KAPPA LC FREE SER-MCNC: 1.35 MG/DL (ref 0.33–1.94)
KAPPA LC FREE/LAMBDA FREE SER NEPH: 1.48 {RATIO} (ref 0.26–1.65)
LAMBDA LC FREE SERPL-MCNC: 0.91 MG/DL (ref 0.57–2.63)
M PROTEIN SERPL ELPH-MCNC: 0.6 G/DL
PROT PATTERN SERPL ELPH-IMP: ABNORMAL

## 2025-07-29 PROCEDURE — 84165 PROTEIN E-PHORESIS SERUM: CPT | Mod: 26 | Performed by: PATHOLOGY

## 2025-07-31 ENCOUNTER — VIRTUAL VISIT (OUTPATIENT)
Dept: ONCOLOGY | Facility: CLINIC | Age: 50
End: 2025-07-31
Attending: INTERNAL MEDICINE
Payer: MEDICAID

## 2025-07-31 VITALS — WEIGHT: 175 LBS | HEIGHT: 73 IN | BODY MASS INDEX: 23.19 KG/M2

## 2025-07-31 DIAGNOSIS — E87.0 HYPERNATREMIA: ICD-10-CM

## 2025-07-31 DIAGNOSIS — D47.2 MGUS (MONOCLONAL GAMMOPATHY OF UNKNOWN SIGNIFICANCE): Primary | ICD-10-CM

## 2025-07-31 ASSESSMENT — PAIN SCALES - GENERAL: PAINLEVEL_OUTOF10: NO PAIN (0)

## 2025-07-31 NOTE — LETTER
7/31/2025      Tip Colin  [address] [place] [address]      Dear Colleague,    Thank you for referring your patient, Tip Colin, to the [facility]. Please see a copy of my visit note below.    Virtual Visit Details    Type of service:  Video Visit   Video Start Time: 7:55 AM  Video End Time:8:12 AM    Originating Location (pt. Location): Home    Distant Location (provider location):  Off-site  Platform used for Video Visit: [facility] Hematology and Oncology Progress Note    Patient: Tip Colin  MRN: [id number]  Date of Service: 05/27/2022        Reason for Visit    Chief Complaint   Patient presents with     RECHECK         Problem List Items Addressed This Visit       MGUS (monoclonal gammopathy of unknown significance) - Primary    Relevant Orders    CBC with Platelets & Differential    IgM    Kappa and lambda light chain    Protein electrophoresis    Protein Immunofixation Serum    COMPREHENSIVE METABOLIC PANEL     Other Visit Diagnoses         Hypernatremia        Relevant Orders    Basic metabolic panel  (Ca, Cl, CO2, Creat, Gluc, K, Na, BUN)                    Assessment and Plan  1. IgM kappa MGUS  Bone marrow biopsy done September 2022 showed 5 to 10% plasma cells with slightly skewed kappa to lambda light chain expression ratio on the flow (with a kappa to lambda ratio of 6:1, which is below the threshold of 10: 1).  Flow cytometry showed polytypic plasma cells which also had partial CD20 expression.  No convincing evidence of multiple myeloma.  No evidence of lymphoma.  We decided to pursue observation only.  FISH negative for 11: 14 translocation.    He has been on observation and his monoclonal peaks have remained pretty steady around 0.5 to 0.6 g/dL.  IgM levels have been around 700-800.  No signs or symptoms of any hyperviscosity.  Clinically not behaving like lymphoma or myeloma at this point in time.  He is here with  repeat labs.    Results  - Monoclonal peak: 0.6 grams  - IgM level: decreased from 847 to 798  - CBC: normal  - Sodium level: slightly elevated  - Chloride level: slightly elevated  - Hemoglobin: 14.4 (within normal range)  - Fasting glucose: 103 (in prediabetic range)  - Cold agglutinin test: no indication of cold agglutination, RBC and MCV levels normal  - I reviewed lab results personally and independently interpreted the results.      Plan  MGUS (monoclonal gammopathy of unknown significance):  The monoclonal peak and IgM levels are steady, with the peak at 0.6 grams and IgM slightly decreased from 847 to 798. CBC is normal.  He is Raina a biopsy in 2022 showed 5 to 10% plasma cells with slightly skewed kappa to lambda light chain ratio however flow cytometry basically showed polytypic plasma cells with a partial 2020 expression.  There was no convincing evidence of any multiple myeloma or lymphoma.  So for all practical purposes we are treating this as MGUS.  The condition is stable, with no significant changes over the past few years.  Continue monitoring with annual checks, as the condition has been stable with minimal fluctuations.    Hypernatremia:  Slightly elevated sodium and chloride levels, likely due to water loss.  Sodium levels were at 148 mEq. No correlation with monoclonal protein levels. Other potential causes, such as ADH hormone deficiency, are considered rare given the current sodium levels.  Increase fluid intake to help reduce sodium levels. Recheck sodium levels in a few weeks. If still elevated, consider endocrinology/renal consultation for further evaluation.       Cancer Staging   No matching staging information was found for the patient.      ECOG Performance    0 - Independent         Problem List    Patient Active Problem List   Diagnosis     Nasal congestion     Plantar fasciitis     Acute gouty arthritis     MGUS (monoclonal gammopathy of unknown significance)     Acute pain of right  "knee     Sialadenitis     Chronic pain of right knee     Chronic left shoulder pain        Interval History   Tip Colin is a 49 year old with recent diagnosis of IgM kappa MGUS who is seen as a video visit for follow-up.    Overall doing well.  No significant issues since last visit.  Recent lab studies did show slightly elevated sodium and chloride levels.  He described variable hydration, sometimes not drinking enough water, especially before blood draws, and denied taking any salt or sodium chloride tablets. He reported generally normal thirst and no increased fluid intake denies any polyuria. He denied feeling more thirsty than usual and did not report excessive fluid consumption. He stated that he eats once a day at night and has been experiencing some heartburn issues. He described being \"always kind of sweaty,\" with his child also commenting that he is \"super wet at night.\" He expressed concern about being cold all the time, wearing thermals frequently, and noted that his father is also like this. He asked whether his cold intolerance could be related to his condition. He reported that his fasting glucose has been \"up there\" and noted that it \"keeps being high,\" but did not recall any intervention or follow-up for this.    He did not report any symptoms such as blurred vision, blood clots, dark urine, or other symptoms related to hyperviscosity or hemolysis. He did not mention any other symptoms or concerns.       Review of Systems  A comprehensive review of systems was negative except for what is noted in the interval history    Current Outpatient Medications   Medication Sig Dispense Refill     Multiple Vitamins-Minerals (CENTRUM ADULTS PO) Take 1 tablet by mouth daily       omeprazole (PRILOSEC) 20 MG DR capsule Take one po q am on empty stomach 30 capsule 1     Vitamin D (Cholecalciferol) 25 MCG (1000 UT) CAPS Take 1 capsule by mouth daily       No current facility-administered medications for this " visit.        Physical Exam    Failed to redirect to the Timeline version of the [facility] SmartLink.    General: alert and cooperative     Lab Results    Recent Results (from the past week)   Kappa and lambda light chain   Result Value Ref Range    Kappa Free Light Chains 1.35 0.33 - 1.94 mg/dL    Lambda Free Light Chains 0.91 0.57 - 2.63 mg/dL    Kappa /Lambda Ratio 1.48 0.26 - 1.65   Basic metabolic panel  (Ca, Cl, CO2, Creat, Gluc, K, Na, BUN)   Result Value Ref Range    Sodium 148 (H) 135 - 145 mmol/L    Potassium 4.3 3.4 - 5.3 mmol/L    Chloride 110 (H) 98 - 107 mmol/L    Carbon Dioxide (CO2) 29 22 - 29 mmol/L    Anion Gap 9 7 - 15 mmol/L    Urea Nitrogen 14.4 6.0 - 20.0 mg/dL    Creatinine 0.86 0.67 - 1.17 mg/dL    GFR Estimate >90 >60 mL/min/1.73m2    Calcium 9.0 8.8 - 10.4 mg/dL    Glucose 103 (H) 70 - 99 mg/dL   IgM   Result Value Ref Range    Immunoglobulin M 798 (H) 35 - 242 mg/dL   Total Protein, Serum for ELP   Result Value Ref Range    Total Protein Serum for ELP 6.6 6.4 - 8.3 g/dL   Protein Electrophoresis, Serum   Result Value Ref Range    Albumin 4.0 3.7 - 5.1 g/dL    Alpha 1 0.2 0.2 - 0.4 g/dL    Alpha 2 0.5 0.5 - 0.9 g/dL    Beta Globulin 0.6 0.6 - 1.0 g/dL    Gamma Globulin 1.2 0.7 - 1.6 g/dL    Monoclonal Peak 0.6 (H) <=0.0 g/dL    ELP Interpretation       Monoclonal protein (0.6 g/dL) seen in the gamma fraction. Previously characterized in our laboratory on 4/22/24 as a monoclonal IgM immunoglobulin of kappa light chain type. Pathologic significance requires clinical correlation. Tommie Johnson MD   CBC with platelets and differential   Result Value Ref Range    WBC Count 7.2 4.0 - 11.0 10e3/uL    RBC Count 4.56 4.40 - 5.90 10e6/uL    Hemoglobin 14.4 13.3 - 17.7 g/dL    Hematocrit 41.9 40.0 - 53.0 %    MCV 92 78 - 100 fL    MCH 31.6 26.5 - 33.0 pg    MCHC 34.4 31.5 - 36.5 g/dL    RDW 11.7 10.0 - 15.0 %    Platelet Count 208 150 - 450 10e3/uL    % Neutrophils 65 %    % Lymphocytes 24 %    %  Monocytes 8 %    % Eosinophils 2 %    % Basophils 1 %    % Immature Granulocytes 0 %    NRBCs per 100 WBC 0 <1 /100    Absolute Neutrophils 4.7 1.6 - 8.3 10e3/uL    Absolute Lymphocytes 1.8 0.8 - 5.3 10e3/uL    Absolute Monocytes 0.5 0.0 - 1.3 10e3/uL    Absolute Eosinophils 0.1 0.0 - 0.7 10e3/uL    Absolute Basophils 0.0 0.0 - 0.2 10e3/uL    Absolute Immature Granulocytes 0.0 <=0.4 10e3/uL    Absolute NRBCs 0.0 10e3/uL           Imaging    No results found.     The longitudinal plan of care for the diagnosis(es)/condition(s) as documented were addressed during this visit. Due to the added complexity in care, I will continue to support Tip in the subsequent management and with ongoing continuity of care.    A total of 30 min was spent today on this visit which included face to face conversation with the patient, EMR review, counseling and co-ordination of care and medical documentation.      Signed by: Gerry Escobar MD    Again, thank you for allowing me to participate in the care of your patient.        Sincerely,        Gerry Escobar MD    Electronically signed

## 2025-07-31 NOTE — NURSING NOTE
Current patient location: [address]  [place] [address]    Is the patient currently in the state of [place]? YES    Visit mode: VIDEO    If the visit is dropped, the patient can be reconnected by:VIDEO VISIT: Text to cell phone:   Telephone Information:   Mobile [phone]       Will anyone else be joining the visit? TennilleKristen's Wife   (If patient encounters technical issues they should call [phone]    Are changes needed to the allergy or medication list? No    Are refills needed on medications prescribed by this physician? NO    Rooming Documentation:  Questionnaire(s) completed    Reason for visit: RECHJAIME Temple F

## 2025-07-31 NOTE — PROGRESS NOTES
Virtual Visit Details    Type of service:  Video Visit   Video Start Time: 7:55 AM  Video End Time:8:12 AM    Originating Location (pt. Location): Home    Distant Location (provider location):  Off-site  Platform used for Video Visit: [facility] Hematology and Oncology Progress Note    Patient: Tip Colin  MRN: [id number]  Date of Service: 05/27/2022        Reason for Visit    Chief Complaint   Patient presents with    RECHECK         Problem List Items Addressed This Visit       MGUS (monoclonal gammopathy of unknown significance) - Primary    Relevant Orders    CBC with Platelets & Differential    IgM    Kappa and lambda light chain    Protein electrophoresis    Protein Immunofixation Serum    COMPREHENSIVE METABOLIC PANEL     Other Visit Diagnoses         Hypernatremia        Relevant Orders    Basic metabolic panel  (Ca, Cl, CO2, Creat, Gluc, K, Na, BUN)                    Assessment and Plan  1. IgM kappa MGUS  Bone marrow biopsy done September 2022 showed 5 to 10% plasma cells with slightly skewed kappa to lambda light chain expression ratio on the flow (with a kappa to lambda ratio of 6:1, which is below the threshold of 10: 1).  Flow cytometry showed polytypic plasma cells which also had partial CD20 expression.  No convincing evidence of multiple myeloma.  No evidence of lymphoma.  We decided to pursue observation only.  FISH negative for 11: 14 translocation.    He has been on observation and his monoclonal peaks have remained pretty steady around 0.5 to 0.6 g/dL.  IgM levels have been around 700-800.  No signs or symptoms of any hyperviscosity.  Clinically not behaving like lymphoma or myeloma at this point in time.  He is here with repeat labs.    Results  - Monoclonal peak: 0.6 grams  - IgM level: decreased from 847 to 798  - CBC: normal  - Sodium level: slightly elevated  - Chloride level: slightly elevated  - Hemoglobin: 14.4 (within normal range)  - Fasting glucose: 103  (in prediabetic range)  - Cold agglutinin test: no indication of cold agglutination, RBC and MCV levels normal  - I reviewed lab results personally and independently interpreted the results.      Plan  MGUS (monoclonal gammopathy of unknown significance):  The monoclonal peak and IgM levels are steady, with the peak at 0.6 grams and IgM slightly decreased from 847 to 798. CBC is normal.  He is Raina a biopsy in 2022 showed 5 to 10% plasma cells with slightly skewed kappa to lambda light chain ratio however flow cytometry basically showed polytypic plasma cells with a partial 2020 expression.  There was no convincing evidence of any multiple myeloma or lymphoma.  So for all practical purposes we are treating this as MGUS.  The condition is stable, with no significant changes over the past few years.  Continue monitoring with annual checks, as the condition has been stable with minimal fluctuations.    Hypernatremia:  Slightly elevated sodium and chloride levels, likely due to water loss.  Sodium levels were at 148 mEq. No correlation with monoclonal protein levels. Other potential causes, such as ADH hormone deficiency, are considered rare given the current sodium levels.  Increase fluid intake to help reduce sodium levels. Recheck sodium levels in a few weeks. If still elevated, consider endocrinology/renal consultation for further evaluation.       Cancer Staging   No matching staging information was found for the patient.      ECOG Performance    0 - Independent         Problem List    Patient Active Problem List   Diagnosis    Nasal congestion    Plantar fasciitis    Acute gouty arthritis    MGUS (monoclonal gammopathy of unknown significance)    Acute pain of right knee    Sialadenitis    Chronic pain of right knee    Chronic left shoulder pain        Interval History   Tip Colin is a 49 year old with recent diagnosis of IgM kappa MGUS who is seen as a video visit for follow-up.    Overall doing well.  No  "significant issues since last visit.  Recent lab studies did show slightly elevated sodium and chloride levels.  He described variable hydration, sometimes not drinking enough water, especially before blood draws, and denied taking any salt or sodium chloride tablets. He reported generally normal thirst and no increased fluid intake denies any polyuria. He denied feeling more thirsty than usual and did not report excessive fluid consumption. He stated that he eats once a day at night and has been experiencing some heartburn issues. He described being \"always kind of sweaty,\" with his child also commenting that he is \"super wet at night.\" He expressed concern about being cold all the time, wearing thermals frequently, and noted that his father is also like this. He asked whether his cold intolerance could be related to his condition. He reported that his fasting glucose has been \"up there\" and noted that it \"keeps being high,\" but did not recall any intervention or follow-up for this.    He did not report any symptoms such as blurred vision, blood clots, dark urine, or other symptoms related to hyperviscosity or hemolysis. He did not mention any other symptoms or concerns.       Review of Systems  A comprehensive review of systems was negative except for what is noted in the interval history    Current Outpatient Medications   Medication Sig Dispense Refill    Multiple Vitamins-Minerals (CENTRUM ADULTS PO) Take 1 tablet by mouth daily      omeprazole (PRILOSEC) 20 MG DR capsule Take one po q am on empty stomach 30 capsule 1    Vitamin D (Cholecalciferol) 25 MCG (1000 UT) CAPS Take 1 capsule by mouth daily       No current facility-administered medications for this visit.        Physical Exam    Failed to redirect to the Timeline version of the [facility] SmartLink.    General: alert and cooperative     Lab Results    Recent Results (from the past week)   Kappa and lambda light chain   Result Value Ref Range    Kappa Free " Light Chains 1.35 0.33 - 1.94 mg/dL    Lambda Free Light Chains 0.91 0.57 - 2.63 mg/dL    Kappa /Lambda Ratio 1.48 0.26 - 1.65   Basic metabolic panel  (Ca, Cl, CO2, Creat, Gluc, K, Na, BUN)   Result Value Ref Range    Sodium 148 (H) 135 - 145 mmol/L    Potassium 4.3 3.4 - 5.3 mmol/L    Chloride 110 (H) 98 - 107 mmol/L    Carbon Dioxide (CO2) 29 22 - 29 mmol/L    Anion Gap 9 7 - 15 mmol/L    Urea Nitrogen 14.4 6.0 - 20.0 mg/dL    Creatinine 0.86 0.67 - 1.17 mg/dL    GFR Estimate >90 >60 mL/min/1.73m2    Calcium 9.0 8.8 - 10.4 mg/dL    Glucose 103 (H) 70 - 99 mg/dL   IgM   Result Value Ref Range    Immunoglobulin M 798 (H) 35 - 242 mg/dL   Total Protein, Serum for ELP   Result Value Ref Range    Total Protein Serum for ELP 6.6 6.4 - 8.3 g/dL   Protein Electrophoresis, Serum   Result Value Ref Range    Albumin 4.0 3.7 - 5.1 g/dL    Alpha 1 0.2 0.2 - 0.4 g/dL    Alpha 2 0.5 0.5 - 0.9 g/dL    Beta Globulin 0.6 0.6 - 1.0 g/dL    Gamma Globulin 1.2 0.7 - 1.6 g/dL    Monoclonal Peak 0.6 (H) <=0.0 g/dL    ELP Interpretation       Monoclonal protein (0.6 g/dL) seen in the gamma fraction. Previously characterized in our laboratory on 4/22/24 as a monoclonal IgM immunoglobulin of kappa light chain type. Pathologic significance requires clinical correlation. Tommie Johnson MD   CBC with platelets and differential   Result Value Ref Range    WBC Count 7.2 4.0 - 11.0 10e3/uL    RBC Count 4.56 4.40 - 5.90 10e6/uL    Hemoglobin 14.4 13.3 - 17.7 g/dL    Hematocrit 41.9 40.0 - 53.0 %    MCV 92 78 - 100 fL    MCH 31.6 26.5 - 33.0 pg    MCHC 34.4 31.5 - 36.5 g/dL    RDW 11.7 10.0 - 15.0 %    Platelet Count 208 150 - 450 10e3/uL    % Neutrophils 65 %    % Lymphocytes 24 %    % Monocytes 8 %    % Eosinophils 2 %    % Basophils 1 %    % Immature Granulocytes 0 %    NRBCs per 100 WBC 0 <1 /100    Absolute Neutrophils 4.7 1.6 - 8.3 10e3/uL    Absolute Lymphocytes 1.8 0.8 - 5.3 10e3/uL    Absolute Monocytes 0.5 0.0 - 1.3 10e3/uL     Absolute Eosinophils 0.1 0.0 - 0.7 10e3/uL    Absolute Basophils 0.0 0.0 - 0.2 10e3/uL    Absolute Immature Granulocytes 0.0 <=0.4 10e3/uL    Absolute NRBCs 0.0 10e3/uL           Imaging    No results found.     The longitudinal plan of care for the diagnosis(es)/condition(s) as documented were addressed during this visit. Due to the added complexity in care, I will continue to support Tip in the subsequent management and with ongoing continuity of care.    A total of 30 min was spent today on this visit which included face to face conversation with the patient, EMR review, counseling and co-ordination of care and medical documentation.      Signed by: Gerry Escobar MD

## 2025-07-31 NOTE — LETTER
7/31/2025      Tip Colin  [address] [place] [address]      Dear Colleague,    Thank you for referring your patient, Tip Colin, to the [facility]. Please see a copy of my visit note below.    Virtual Visit Details    Type of service:  Video Visit   Video Start Time: 7:55 AM  Video End Time:8:12 AM    Originating Location (pt. Location): Home    Distant Location (provider location):  Off-site  Platform used for Video Visit: [facility] Progress Note    Patient: Tip Colin  MRN: [id number]  Date of Service: 05/27/2022        Reason for Visit    Chief Complaint   Patient presents with     RECHECK         Problem List Items Addressed This Visit       MGUS (monoclonal gammopathy of unknown significance) - Primary    Relevant Orders    CBC with Platelets & Differential    IgM    Kappa and lambda light chain    Protein electrophoresis    Protein Immunofixation Serum    COMPREHENSIVE METABOLIC PANEL     Other Visit Diagnoses         Hypernatremia        Relevant Orders    Basic metabolic panel  (Ca, Cl, CO2, Creat, Gluc, K, Na, BUN)                    Assessment and Plan  1. IgM kappa MGUS  Bone marrow biopsy done September 2022 showed 5 to 10% plasma cells with slightly skewed kappa to lambda light chain expression ratio on the flow (with a kappa to lambda ratio of 6:1, which is below the threshold of 10: 1).  Flow cytometry showed polytypic plasma cells which also had partial CD20 expression.  No convincing evidence of multiple myeloma.  No evidence of lymphoma.  We decided to pursue observation only.  FISH negative for 11: 14 translocation.    He has been on observation and his monoclonal peaks have remained pretty steady around 0.5 to 0.6 g/dL.  IgM levels have been around 700-800.  No signs or symptoms of any hyperviscosity.  Clinically not behaving like lymphoma or myeloma at this point in time.  He is here with  repeat labs.    Results  - Monoclonal peak: 0.6 grams  - IgM level: decreased from 847 to 798  - CBC: normal  - Sodium level: slightly elevated  - Chloride level: slightly elevated  - Hemoglobin: 14.4 (within normal range)  - Fasting glucose: 103 (in prediabetic range)  - Cold agglutinin test: no indication of cold agglutination, RBC and MCV levels normal  - I reviewed lab results personally and independently interpreted the results.      Plan  MGUS (monoclonal gammopathy of unknown significance):  The monoclonal peak and IgM levels are steady, with the peak at 0.6 grams and IgM slightly decreased from 847 to 798. CBC is normal.  He is Raina a biopsy in 2022 showed 5 to 10% plasma cells with slightly skewed kappa to lambda light chain ratio however flow cytometry basically showed polytypic plasma cells with a partial 2020 expression.  There was no convincing evidence of any multiple myeloma or lymphoma.  So for all practical purposes we are treating this as MGUS.  The condition is stable, with no significant changes over the past few years.  Continue monitoring with annual checks, as the condition has been stable with minimal fluctuations.    Hypernatremia:  Slightly elevated sodium and chloride levels, likely due to water loss.  Sodium levels were at 148 mEq. No correlation with monoclonal protein levels. Other potential causes, such as ADH hormone deficiency, are considered rare given the current sodium levels.  Increase fluid intake to help reduce sodium levels. Recheck sodium levels in a few weeks. If still elevated, consider endocrinology/renal consultation for further evaluation.       Cancer Staging   No matching staging information was found for the patient.      ECOG Performance    0 - Independent         Problem List    Patient Active Problem List   Diagnosis     Nasal congestion     Plantar fasciitis     Acute gouty arthritis     MGUS (monoclonal gammopathy of unknown significance)     Acute pain of right  "knee     Sialadenitis     Chronic pain of right knee     Chronic left shoulder pain        Interval History   Tip Colin is a 49 year old with recent diagnosis of IgM kappa MGUS who is seen as a video visit for follow-up.    Overall doing well.  No significant issues since last visit.  Recent lab studies did show slightly elevated sodium and chloride levels.  He described variable hydration, sometimes not drinking enough water, especially before blood draws, and denied taking any salt or sodium chloride tablets. He reported generally normal thirst and no increased fluid intake denies any polyuria. He denied feeling more thirsty than usual and did not report excessive fluid consumption. He stated that he eats once a day at night and has been experiencing some heartburn issues. He described being \"always kind of sweaty,\" with his child also commenting that he is \"super wet at night.\" He expressed concern about being cold all the time, wearing thermals frequently, and noted that his father is also like this. He asked whether his cold intolerance could be related to his condition. He reported that his fasting glucose has been \"up there\" and noted that it \"keeps being high,\" but did not recall any intervention or follow-up for this.    He did not report any symptoms such as blurred vision, blood clots, dark urine, or other symptoms related to hyperviscosity or hemolysis. He did not mention any other symptoms or concerns.       Review of Systems  A comprehensive review of systems was negative except for what is noted in the interval history    Current Outpatient Medications   Medication Sig Dispense Refill     Multiple Vitamins-Minerals (CENTRUM ADULTS PO) Take 1 tablet by mouth daily       omeprazole (PRILOSEC) 20 MG DR capsule Take one po q am on empty stomach 30 capsule 1     Vitamin D (Cholecalciferol) 25 MCG (1000 UT) CAPS Take 1 capsule by mouth daily       No current facility-administered medications for this " visit.        Physical Exam    Failed to redirect to the Timeline version of the REVFS SmartLink.    General: alert and cooperative     Lab Results    Recent Results (from the past week)   Kappa and lambda light chain   Result Value Ref Range    Kappa Free Light Chains 1.35 0.33 - 1.94 mg/dL    Lambda Free Light Chains 0.91 0.57 - 2.63 mg/dL    Kappa /Lambda Ratio 1.48 0.26 - 1.65   Basic metabolic panel  (Ca, Cl, CO2, Creat, Gluc, K, Na, BUN)   Result Value Ref Range    Sodium 148 (H) 135 - 145 mmol/L    Potassium 4.3 3.4 - 5.3 mmol/L    Chloride 110 (H) 98 - 107 mmol/L    Carbon Dioxide (CO2) 29 22 - 29 mmol/L    Anion Gap 9 7 - 15 mmol/L    Urea Nitrogen 14.4 6.0 - 20.0 mg/dL    Creatinine 0.86 0.67 - 1.17 mg/dL    GFR Estimate >90 >60 mL/min/1.73m2    Calcium 9.0 8.8 - 10.4 mg/dL    Glucose 103 (H) 70 - 99 mg/dL   IgM   Result Value Ref Range    Immunoglobulin M 798 (H) 35 - 242 mg/dL   Total Protein, Serum for ELP   Result Value Ref Range    Total Protein Serum for ELP 6.6 6.4 - 8.3 g/dL   Protein Electrophoresis, Serum   Result Value Ref Range    Albumin 4.0 3.7 - 5.1 g/dL    Alpha 1 0.2 0.2 - 0.4 g/dL    Alpha 2 0.5 0.5 - 0.9 g/dL    Beta Globulin 0.6 0.6 - 1.0 g/dL    Gamma Globulin 1.2 0.7 - 1.6 g/dL    Monoclonal Peak 0.6 (H) <=0.0 g/dL    ELP Interpretation       Monoclonal protein (0.6 g/dL) seen in the gamma fraction. Previously characterized in our laboratory on 4/22/24 as a monoclonal IgM immunoglobulin of kappa light chain type. Pathologic significance requires clinical correlation. Tommie Johnson MD   CBC with platelets and differential   Result Value Ref Range    WBC Count 7.2 4.0 - 11.0 10e3/uL    RBC Count 4.56 4.40 - 5.90 10e6/uL    Hemoglobin 14.4 13.3 - 17.7 g/dL    Hematocrit 41.9 40.0 - 53.0 %    MCV 92 78 - 100 fL    MCH 31.6 26.5 - 33.0 pg    MCHC 34.4 31.5 - 36.5 g/dL    RDW 11.7 10.0 - 15.0 %    Platelet Count 208 150 - 450 10e3/uL    % Neutrophils 65 %    % Lymphocytes 24 %    %  Monocytes 8 %    % Eosinophils 2 %    % Basophils 1 %    % Immature Granulocytes 0 %    NRBCs per 100 WBC 0 <1 /100    Absolute Neutrophils 4.7 1.6 - 8.3 10e3/uL    Absolute Lymphocytes 1.8 0.8 - 5.3 10e3/uL    Absolute Monocytes 0.5 0.0 - 1.3 10e3/uL    Absolute Eosinophils 0.1 0.0 - 0.7 10e3/uL    Absolute Basophils 0.0 0.0 - 0.2 10e3/uL    Absolute Immature Granulocytes 0.0 <=0.4 10e3/uL    Absolute NRBCs 0.0 10e3/uL           Imaging    No results found.     The longitudinal plan of care for the diagnosis(es)/condition(s) as documented were addressed during this visit. Due to the added complexity in care, I will continue to support Tip in the subsequent management and with ongoing continuity of care.    A total of 30 min was spent today on this visit which included face to face conversation with the patient, EMR review, counseling and co-ordination of care and medical documentation.      Signed by: Gerry Escobar MD    Again, thank you for allowing me to participate in the care of your patient.        Sincerely,        Gerry Escobar MD    Electronically signed